# Patient Record
Sex: FEMALE | Race: WHITE | NOT HISPANIC OR LATINO | Employment: FULL TIME | URBAN - METROPOLITAN AREA
[De-identification: names, ages, dates, MRNs, and addresses within clinical notes are randomized per-mention and may not be internally consistent; named-entity substitution may affect disease eponyms.]

---

## 2017-02-22 ENCOUNTER — HISTORICAL (OUTPATIENT)
Dept: RADIOLOGY | Facility: HOSPITAL | Age: 22
End: 2017-02-22

## 2017-05-24 ENCOUNTER — HISTORICAL (OUTPATIENT)
Dept: SURGERY | Facility: HOSPITAL | Age: 22
End: 2017-05-24

## 2017-05-24 LAB
ABS NEUT (OLG): 4.8 X10(3)/MCL (ref 1.5–6.9)
B-HCG SERPL QL: NEGATIVE
BUN SERPL-MCNC: 6 MG/DL (ref 10–20)
CALCIUM SERPL-MCNC: 8.7 MG/DL (ref 8–10.5)
CHLORIDE SERPL-SCNC: 106 MMOL/L (ref 100–108)
CO2 SERPL-SCNC: 29 MMOL/L (ref 21–35)
CREAT SERPL-MCNC: 0.64 MG/DL (ref 0.7–1.3)
ERYTHROCYTE [DISTWIDTH] IN BLOOD BY AUTOMATED COUNT: 12.3 % (ref 11.5–17)
GLUCOSE SERPL-MCNC: 81 MG/DL (ref 75–116)
GROUP & RH: NORMAL
HCT VFR BLD AUTO: 36.3 % (ref 36–48)
HGB BLD-MCNC: 13.1 GM/DL (ref 12–16)
MCH RBC QN AUTO: 31 PG (ref 27–34)
MCHC RBC AUTO-ENTMCNC: 36 GM/DL (ref 31–36)
MCV RBC AUTO: 86 FL (ref 80–99)
PLATELET # BLD AUTO: 309 X10(3)/MCL (ref 140–400)
PMV BLD AUTO: 9.3 FL (ref 6.8–10)
POTASSIUM SERPL-SCNC: 3.8 MMOL/L (ref 3.6–5.2)
RBC # BLD AUTO: 4.23 X10(6)/MCL (ref 4.2–5.4)
SODIUM SERPL-SCNC: 141 MMOL/L (ref 135–145)
WBC # SPEC AUTO: 7.3 X10(3)/MCL (ref 4.5–11.5)

## 2017-05-26 ENCOUNTER — HISTORICAL (OUTPATIENT)
Dept: ANESTHESIOLOGY | Facility: HOSPITAL | Age: 22
End: 2017-05-26

## 2017-08-03 ENCOUNTER — HISTORICAL (OUTPATIENT)
Dept: RADIOLOGY | Facility: HOSPITAL | Age: 22
End: 2017-08-03

## 2017-08-03 LAB
IRON SERPL-MCNC: 126 MCG/DL (ref 50–170)
T3FREE SERPL-MCNC: 3.11 PG/ML (ref 2.18–3.98)
T4 FREE SERPL-MCNC: 0.95 NG/DL (ref 0.76–1.46)
TESTOST SERPL-MCNC: 41.6 NG/DL (ref 14–76)
TSH SERPL-ACNC: 1.04 MIU/ML (ref 0.36–3.74)
VIT B12 SERPL-MCNC: 616 PG/ML (ref 193–986)

## 2018-02-21 ENCOUNTER — HISTORICAL (OUTPATIENT)
Dept: RADIOLOGY | Facility: HOSPITAL | Age: 23
End: 2018-02-21

## 2018-02-21 LAB
ABS NEUT (OLG): 5.3 X10(3)/MCL (ref 1.5–6.9)
ALBUMIN SERPL-MCNC: 3.8 GM/DL (ref 3.4–5)
ALBUMIN/GLOB SERPL: 1 RATIO
ALP SERPL-CCNC: 61 UNIT/L (ref 30–113)
ALT SERPL-CCNC: 25 UNIT/L (ref 10–45)
AST SERPL-CCNC: 14 UNIT/L (ref 15–37)
BILIRUB SERPL-MCNC: 0.7 MG/DL (ref 0.1–0.9)
BILIRUBIN DIRECT+TOT PNL SERPL-MCNC: 0.2 MG/DL (ref 0–0.3)
BILIRUBIN DIRECT+TOT PNL SERPL-MCNC: 0.5 MG/DL
BUN SERPL-MCNC: 11 MG/DL (ref 10–20)
CALCIUM SERPL-MCNC: 9.1 MG/DL (ref 8–10.5)
CHLORIDE SERPL-SCNC: 102 MMOL/L (ref 100–108)
CO2 SERPL-SCNC: 26 MMOL/L (ref 21–35)
CREAT SERPL-MCNC: 0.69 MG/DL (ref 0.7–1.3)
DEPRECATED CALCIDIOL+CALCIFEROL SERPL-MC: 35.73 NG/ML (ref 30–80)
ERYTHROCYTE [DISTWIDTH] IN BLOOD BY AUTOMATED COUNT: 12.5 % (ref 11.5–17)
GLOBULIN SER-MCNC: 3.9 GM/DL
GLUCOSE SERPL-MCNC: 84 MG/DL (ref 75–116)
HCT VFR BLD AUTO: 37.5 % (ref 36–48)
HGB BLD-MCNC: 13.4 GM/DL (ref 12–16)
MAGNESIUM SERPL-MCNC: 1.9 MG/DL (ref 1.8–2.4)
MCH RBC QN AUTO: 30 PG (ref 27–34)
MCHC RBC AUTO-ENTMCNC: 36 GM/DL (ref 31–36)
MCV RBC AUTO: 85 FL (ref 80–99)
PHOSPHATE SERPL-MCNC: 3.2 MG/DL (ref 2.6–4.7)
PLATELET # BLD AUTO: 292 X10(3)/MCL (ref 140–400)
PMV BLD AUTO: 10.1 FL (ref 6.8–10)
POTASSIUM SERPL-SCNC: 3.5 MMOL/L (ref 3.6–5.2)
PROT SERPL-MCNC: 7.7 GM/DL (ref 6.4–8.2)
PTH-INTACT SERPL-MCNC: 39.7 PG/ML (ref 18.4–80.1)
RBC # BLD AUTO: 4.43 X10(6)/MCL (ref 4.2–5.4)
SODIUM SERPL-SCNC: 137 MMOL/L (ref 135–145)
TSH SERPL-ACNC: 0.95 MIU/ML (ref 0.36–3.74)
WBC # SPEC AUTO: 8.1 X10(3)/MCL (ref 4.5–11.5)

## 2018-03-05 ENCOUNTER — HISTORICAL (OUTPATIENT)
Dept: RADIOLOGY | Facility: HOSPITAL | Age: 23
End: 2018-03-05

## 2018-04-23 ENCOUNTER — HISTORICAL (OUTPATIENT)
Dept: SURGERY | Facility: HOSPITAL | Age: 23
End: 2018-04-23

## 2018-04-23 LAB
ABS NEUT (OLG): 3.4 X10(3)/MCL (ref 1.5–6.9)
B-HCG SERPL QL: NEGATIVE
BUN SERPL-MCNC: 8 MG/DL (ref 10–20)
CALCIUM SERPL-MCNC: 9.2 MG/DL (ref 8–10.5)
CHLORIDE SERPL-SCNC: 103 MMOL/L (ref 100–108)
CO2 SERPL-SCNC: 29 MMOL/L (ref 21–35)
CREAT SERPL-MCNC: 0.58 MG/DL (ref 0.7–1.3)
CREAT/UREA NIT SERPL: 14
ERYTHROCYTE [DISTWIDTH] IN BLOOD BY AUTOMATED COUNT: 12.3 % (ref 11.5–17)
GLUCOSE SERPL-MCNC: 83 MG/DL (ref 75–116)
GROUP & RH: NORMAL
HCT VFR BLD AUTO: 37.9 % (ref 36–48)
HGB BLD-MCNC: 13.4 GM/DL (ref 12–16)
MCH RBC QN AUTO: 31 PG (ref 27–34)
MCHC RBC AUTO-ENTMCNC: 35 GM/DL (ref 31–36)
MCV RBC AUTO: 87 FL (ref 80–99)
PLATELET # BLD AUTO: 308 X10(3)/MCL (ref 140–400)
PMV BLD AUTO: 9.4 FL (ref 6.8–10)
POTASSIUM SERPL-SCNC: 3.8 MMOL/L (ref 3.6–5.2)
RBC # BLD AUTO: 4.37 X10(6)/MCL (ref 4.2–5.4)
SODIUM SERPL-SCNC: 140 MMOL/L (ref 135–145)
WBC # SPEC AUTO: 6.6 X10(3)/MCL (ref 4.5–11.5)

## 2018-04-26 ENCOUNTER — HISTORICAL (OUTPATIENT)
Dept: ANESTHESIOLOGY | Facility: HOSPITAL | Age: 23
End: 2018-04-26

## 2018-09-26 ENCOUNTER — HISTORICAL (OUTPATIENT)
Dept: RADIOLOGY | Facility: HOSPITAL | Age: 23
End: 2018-09-26

## 2019-05-31 ENCOUNTER — HISTORICAL (OUTPATIENT)
Dept: LAB | Facility: HOSPITAL | Age: 24
End: 2019-05-31

## 2019-05-31 LAB
ABS NEUT (OLG): 5.2 X10(3)/MCL (ref 1.5–6.9)
ALBUMIN SERPL-MCNC: 3.6 GM/DL (ref 3.4–5)
ALBUMIN/GLOB SERPL: 1.1 RATIO
ALP SERPL-CCNC: 49 UNIT/L (ref 30–113)
ALT SERPL-CCNC: 15 UNIT/L (ref 10–45)
AST SERPL-CCNC: 8 UNIT/L (ref 15–37)
BASOPHILS # BLD AUTO: 0 X10(3)/MCL (ref 0–0.1)
BASOPHILS NFR BLD AUTO: 0 % (ref 0–1)
BILIRUB SERPL-MCNC: 0.4 MG/DL (ref 0.1–0.9)
BILIRUBIN DIRECT+TOT PNL SERPL-MCNC: 0.1 MG/DL (ref 0–0.3)
BILIRUBIN DIRECT+TOT PNL SERPL-MCNC: 0.3 MG/DL
BUN SERPL-MCNC: 9 MG/DL (ref 10–20)
CALCIUM SERPL-MCNC: 9 MG/DL (ref 8–10.5)
CHLORIDE SERPL-SCNC: 105 MMOL/L (ref 100–108)
CO2 SERPL-SCNC: 28 MMOL/L (ref 21–35)
CREAT SERPL-MCNC: 0.79 MG/DL (ref 0.7–1.3)
DEPRECATED CALCIDIOL+CALCIFEROL SERPL-MC: 39.59 NG/ML (ref 30–80)
EOSINOPHIL # BLD AUTO: 0.1 X10(3)/MCL (ref 0–0.6)
EOSINOPHIL NFR BLD AUTO: 1 % (ref 0–5)
ERYTHROCYTE [DISTWIDTH] IN BLOOD BY AUTOMATED COUNT: 12.1 % (ref 11.5–17)
GLOBULIN SER-MCNC: 3.4 GM/DL
GLUCOSE SERPL-MCNC: 76 MG/DL (ref 75–116)
HCT VFR BLD AUTO: 36.6 % (ref 36–48)
HGB BLD-MCNC: 12.5 GM/DL (ref 12–16)
IMM GRANULOCYTES # BLD AUTO: 0.03 10*3/UL (ref 0–0.02)
IMM GRANULOCYTES NFR BLD AUTO: 0.4 % (ref 0–0.43)
LYMPHOCYTES # BLD AUTO: 1.9 X10(3)/MCL (ref 0.5–4.1)
LYMPHOCYTES NFR BLD AUTO: 25 % (ref 15–40)
MCH RBC QN AUTO: 31 PG (ref 27–34)
MCHC RBC AUTO-ENTMCNC: 34 GM/DL (ref 31–36)
MCV RBC AUTO: 92 FL (ref 80–99)
MONOCYTES # BLD AUTO: 0.3 X10(3)/MCL (ref 0–1.1)
MONOCYTES NFR BLD AUTO: 4 % (ref 4–12)
NEUTROPHILS # BLD AUTO: 5.2 X10(3)/MCL (ref 1.5–6.9)
NEUTROPHILS NFR BLD AUTO: 68 % (ref 43–75)
PLATELET # BLD AUTO: 235 X10(3)/MCL (ref 140–400)
PMV BLD AUTO: 9.7 FL (ref 6.8–10)
POTASSIUM SERPL-SCNC: 3.4 MMOL/L (ref 3.6–5.2)
PROT SERPL-MCNC: 7 GM/DL (ref 6.4–8.2)
RBC # BLD AUTO: 3.99 X10(6)/MCL (ref 4.2–5.4)
SODIUM SERPL-SCNC: 141 MMOL/L (ref 135–145)
T3FREE SERPL-MCNC: 2.53 PG/ML (ref 2.18–3.98)
T4 SERPL-MCNC: 10.5 MCG/DL (ref 5.3–11.5)
TESTOST SERPL-MCNC: 15 NG/DL (ref 14–76)
TSH SERPL-ACNC: 0.64 MIU/ML (ref 0.36–3.74)
VIT B12 SERPL-MCNC: 446 PG/ML (ref 193–986)
WBC # SPEC AUTO: 7.6 X10(3)/MCL (ref 4.5–11.5)

## 2019-09-17 ENCOUNTER — HISTORICAL (OUTPATIENT)
Dept: LAB | Facility: HOSPITAL | Age: 24
End: 2019-09-17

## 2019-09-17 LAB
ABS NEUT (OLG): 3.8 X10(3)/MCL (ref 1.5–6.9)
ALBUMIN SERPL-MCNC: 3.8 GM/DL (ref 3.4–5)
ALBUMIN/GLOB SERPL: 1 RATIO
ALP SERPL-CCNC: 44 UNIT/L (ref 30–113)
ALT SERPL-CCNC: 16 UNIT/L (ref 10–45)
APPEARANCE, UA: CLEAR
AST SERPL-CCNC: 9 UNIT/L (ref 15–37)
BACTERIA SPEC CULT: NORMAL /HPF
BASOPHILS # BLD AUTO: 0 X10(3)/MCL (ref 0–0.1)
BASOPHILS NFR BLD AUTO: 0 % (ref 0–1)
BILIRUB SERPL-MCNC: 0.3 MG/DL (ref 0.1–0.9)
BILIRUB UR QL STRIP: NEGATIVE
BILIRUBIN DIRECT+TOT PNL SERPL-MCNC: 0.1 MG/DL (ref 0–0.3)
BILIRUBIN DIRECT+TOT PNL SERPL-MCNC: 0.2 MG/DL
BUN SERPL-MCNC: 8 MG/DL (ref 10–20)
CALCIUM SERPL-MCNC: 8.7 MG/DL (ref 8–10.5)
CHLORIDE SERPL-SCNC: 104 MMOL/L (ref 100–108)
CK MB SERPL-MCNC: 0.5 NG/ML (ref 0–3.6)
CK SERPL-CCNC: 31 UNIT/L (ref 39–225)
CO2 SERPL-SCNC: 28 MMOL/L (ref 21–35)
COLOR UR: YELLOW
CREAT SERPL-MCNC: 0.71 MG/DL (ref 0.7–1.3)
CRP SERPL-MCNC: <0.2 MG/DL (ref 0–0.9)
EOSINOPHIL # BLD AUTO: 0.2 X10(3)/MCL (ref 0–0.6)
EOSINOPHIL NFR BLD AUTO: 2 % (ref 0–5)
ERYTHROCYTE [DISTWIDTH] IN BLOOD BY AUTOMATED COUNT: 11.9 % (ref 11.5–17)
ERYTHROCYTE [SEDIMENTATION RATE] IN BLOOD: 11 MM/HR (ref 0–20)
GLOBULIN SER-MCNC: 3.9 GM/DL
GLUCOSE (UA): NEGATIVE
GLUCOSE SERPL-MCNC: 80 MG/DL (ref 75–116)
HCT VFR BLD AUTO: 37.4 % (ref 36–48)
HGB BLD-MCNC: 13 GM/DL (ref 12–16)
HGB UR QL STRIP: ABNORMAL
IMM GRANULOCYTES # BLD AUTO: 0.02 10*3/UL (ref 0–0.02)
IMM GRANULOCYTES NFR BLD AUTO: 0.3 % (ref 0–0.43)
KETONES UR QL STRIP: NEGATIVE
LEUKOCYTE ESTERASE UR QL STRIP: NEGATIVE
LYMPHOCYTES # BLD AUTO: 2.4 X10(3)/MCL (ref 0.5–4.1)
LYMPHOCYTES NFR BLD AUTO: 35 % (ref 15–40)
MCH RBC QN AUTO: 32 PG (ref 27–34)
MCHC RBC AUTO-ENTMCNC: 35 GM/DL (ref 31–36)
MCV RBC AUTO: 91 FL (ref 80–99)
MONOCYTES # BLD AUTO: 0.6 X10(3)/MCL (ref 0–1.1)
MONOCYTES NFR BLD AUTO: 8 % (ref 4–12)
NEUTROPHILS # BLD AUTO: 3.8 X10(3)/MCL (ref 1.5–6.9)
NEUTROPHILS NFR BLD AUTO: 54 % (ref 43–75)
NITRITE UR QL STRIP: NEGATIVE
PH UR STRIP: 6 [PH]
PLATELET # BLD AUTO: 301 X10(3)/MCL (ref 140–400)
PMV BLD AUTO: 9.6 FL (ref 6.8–10)
POTASSIUM SERPL-SCNC: 3.5 MMOL/L (ref 3.6–5.2)
PROT SERPL-MCNC: 7.7 GM/DL (ref 6.4–8.2)
PROT UR QL STRIP: NEGATIVE
RBC # BLD AUTO: 4.11 X10(6)/MCL (ref 4.2–5.4)
RBC #/AREA URNS HPF: NORMAL /HPF
SODIUM SERPL-SCNC: 141 MMOL/L (ref 135–145)
SP GR UR STRIP: 1.02
SQUAMOUS EPITHELIAL, UA: NORMAL
T3FREE SERPL-MCNC: 2.83 PG/ML (ref 2.18–3.98)
T4 SERPL-MCNC: 13 MCG/DL (ref 5.3–11.5)
TROPONIN I SERPL-MCNC: <0.017 NG/ML (ref 0–0.06)
TSH SERPL-ACNC: 1.2 MIU/ML (ref 0.36–3.74)
URATE SERPL-MCNC: 3.9 MG/DL (ref 2.6–7.2)
UROBILINOGEN UR STRIP-ACNC: 1 EU/DL
WBC # SPEC AUTO: 6.9 X10(3)/MCL (ref 4.5–11.5)
WBC #/AREA URNS HPF: NORMAL /[HPF]

## 2020-02-26 LAB
ABS NEUT (OLG): 4.6 X10(3)/MCL (ref 1.5–6.9)
B-HCG SERPL QL: NEGATIVE
BUN SERPL-MCNC: 13 MG/DL (ref 10–20)
CALCIUM SERPL-MCNC: 9 MG/DL (ref 8–10.5)
CHLORIDE SERPL-SCNC: 106 MMOL/L (ref 100–108)
CO2 SERPL-SCNC: 32 MMOL/L (ref 21–35)
CREAT SERPL-MCNC: 0.81 MG/DL (ref 0.7–1.3)
CREAT/UREA NIT SERPL: 16
ERYTHROCYTE [DISTWIDTH] IN BLOOD BY AUTOMATED COUNT: 12.1 % (ref 11.5–17)
GLUCOSE SERPL-MCNC: 87 MG/DL (ref 75–116)
GROUP & RH: NORMAL
HCT VFR BLD AUTO: 37.7 % (ref 36–48)
HGB BLD-MCNC: 13.2 GM/DL (ref 12–16)
MCH RBC QN AUTO: 31 PG (ref 27–34)
MCHC RBC AUTO-ENTMCNC: 35 GM/DL (ref 31–36)
MCV RBC AUTO: 88 FL (ref 80–99)
PLATELET # BLD AUTO: 246 X10(3)/MCL (ref 140–400)
PMV BLD AUTO: 8.9 FL (ref 6.8–10)
POTASSIUM SERPL-SCNC: 3.7 MMOL/L (ref 3.6–5.2)
RBC # BLD AUTO: 4.28 X10(6)/MCL (ref 4.2–5.4)
SODIUM SERPL-SCNC: 144 MMOL/L (ref 135–145)
WBC # SPEC AUTO: 7.5 X10(3)/MCL (ref 4.5–11.5)

## 2020-02-28 ENCOUNTER — HISTORICAL (OUTPATIENT)
Dept: ANESTHESIOLOGY | Facility: HOSPITAL | Age: 25
End: 2020-02-28

## 2020-12-04 ENCOUNTER — HISTORICAL (OUTPATIENT)
Dept: LAB | Facility: HOSPITAL | Age: 25
End: 2020-12-04

## 2020-12-04 LAB
ABS NEUT (OLG): 2.9 X10(3)/MCL (ref 1.5–6.9)
ALBUMIN SERPL-MCNC: 4 GM/DL (ref 3.5–5)
ALBUMIN/GLOB SERPL: 1.3 RATIO (ref 1.1–2)
ALP SERPL-CCNC: 50 UNIT/L (ref 40–150)
ALT SERPL-CCNC: 12 UNIT/L (ref 0–55)
AST SERPL-CCNC: 13 UNIT/L (ref 5–34)
BILIRUB SERPL-MCNC: 0.7 MG/DL
BILIRUBIN DIRECT+TOT PNL SERPL-MCNC: 0.3 MG/DL (ref 0–0.5)
BILIRUBIN DIRECT+TOT PNL SERPL-MCNC: 0.4 MG/DL (ref 0–0.8)
BUN SERPL-MCNC: 8 MG/DL (ref 7–18.7)
CALCIUM SERPL-MCNC: 9.2 MG/DL (ref 8.4–10.2)
CHLORIDE SERPL-SCNC: 104 MMOL/L (ref 98–107)
CO2 SERPL-SCNC: 29 MMOL/L (ref 22–29)
CREAT SERPL-MCNC: 0.73 MG/DL (ref 0.55–1.02)
ERYTHROCYTE [DISTWIDTH] IN BLOOD BY AUTOMATED COUNT: 11.9 % (ref 11.5–17)
GLOBULIN SER-MCNC: 3 GM/DL (ref 2.4–3.5)
GLUCOSE SERPL-MCNC: 81 MG/DL (ref 74–100)
HCT VFR BLD AUTO: 37.3 % (ref 36–48)
HGB BLD-MCNC: 12.9 GM/DL (ref 12–16)
HIV 1+2 AB+HIV1 P24 AG SERPL QL IA: NONREACTIVE
MCH RBC QN AUTO: 31 PG (ref 27–34)
MCHC RBC AUTO-ENTMCNC: 35 GM/DL (ref 31–36)
MCV RBC AUTO: 89 FL (ref 80–99)
PLATELET # BLD AUTO: 253 X10(3)/MCL (ref 140–400)
PMV BLD AUTO: 9.2 FL (ref 6.8–10)
POTASSIUM SERPL-SCNC: 3.5 MMOL/L (ref 3.5–5.1)
PROT SERPL-MCNC: 7 GM/DL (ref 6.4–8.3)
RBC # BLD AUTO: 4.2 X10(6)/MCL (ref 4.2–5.4)
SODIUM SERPL-SCNC: 141 MMOL/L (ref 136–145)
TSH SERPL-ACNC: 1.43 UIU/ML (ref 0.35–4.94)
WBC # SPEC AUTO: 5.9 X10(3)/MCL (ref 4.5–11.5)

## 2020-12-13 ENCOUNTER — HISTORICAL (OUTPATIENT)
Dept: ADMINISTRATIVE | Facility: HOSPITAL | Age: 25
End: 2020-12-13

## 2020-12-16 ENCOUNTER — HISTORICAL (OUTPATIENT)
Dept: LAB | Facility: HOSPITAL | Age: 25
End: 2020-12-16

## 2020-12-16 LAB — B-HCG FREE SERPL-ACNC: 177.74 MIU/ML

## 2020-12-28 LAB
BILIRUB SERPL-MCNC: NEGATIVE MG/DL
BLOOD URINE, POC: NEGATIVE
CLARITY, POC UA: NORMAL
COLOR, POC UA: YELLOW
GLUCOSE UR QL STRIP: NEGATIVE
KETONES UR QL STRIP: NEGATIVE
LEUKOCYTE EST, POC UA: NEGATIVE
NITRITE, POC UA: NEGATIVE
PH, POC UA: 7
PROTEIN, POC: NEGATIVE
SPECIFIC GRAVITY, POC UA: 1.02
UROBILINOGEN, POC UA: NORMAL

## 2021-01-13 LAB
CLARITY, POC UA: CLEAR
COLOR, POC UA: YELLOW
GLUCOSE UR QL STRIP: NEGATIVE
LEUKOCYTE EST, POC UA: NEGATIVE
NITRITE, POC UA: NEGATIVE
PROTEIN, POC: NEGATIVE

## 2021-03-31 ENCOUNTER — HISTORICAL (OUTPATIENT)
Dept: ADMINISTRATIVE | Facility: HOSPITAL | Age: 26
End: 2021-03-31

## 2021-03-31 LAB
ABS NEUT (OLG): 6.67 X10(3)/MCL (ref 2.1–9.2)
BASOPHILS # BLD AUTO: 0 X10(3)/MCL (ref 0–0.2)
BASOPHILS NFR BLD AUTO: 0 %
EOSINOPHIL # BLD AUTO: 0.1 X10(3)/MCL (ref 0–0.9)
EOSINOPHIL NFR BLD AUTO: 1 %
ERYTHROCYTE [DISTWIDTH] IN BLOOD BY AUTOMATED COUNT: 13.1 % (ref 11.5–17)
GROUP & RH: NORMAL
HBV SURFACE AG SERPL QL IA: NONREACTIVE
HCT VFR BLD AUTO: 34.3 % (ref 37–47)
HCV AB SERPL QL IA: NONREACTIVE
HGB BLD-MCNC: 11.6 GM/DL (ref 12–16)
HIV 1+2 AB+HIV1 P24 AG SERPL QL IA: NONREACTIVE
LYMPHOCYTES # BLD AUTO: 1.7 X10(3)/MCL (ref 0.6–4.6)
LYMPHOCYTES NFR BLD AUTO: 19 %
MCH RBC QN AUTO: 30.8 PG (ref 27–31)
MCHC RBC AUTO-ENTMCNC: 33.8 GM/DL (ref 33–36)
MCV RBC AUTO: 91 FL (ref 80–94)
MONOCYTES # BLD AUTO: 0.5 X10(3)/MCL (ref 0.1–1.3)
MONOCYTES NFR BLD AUTO: 5 %
NEUTROPHILS # BLD AUTO: 6.67 X10(3)/MCL (ref 2.1–9.2)
NEUTROPHILS NFR BLD AUTO: 73 %
PLATELET # BLD AUTO: 270 X10(3)/MCL (ref 130–400)
PMV BLD AUTO: 9.6 FL (ref 9.4–12.4)
RBC # BLD AUTO: 3.77 X10(6)/MCL (ref 4.2–5.4)
T PALLIDUM AB SER QL: NONREACTIVE
TSH SERPL-ACNC: 1.16 UIU/ML (ref 0.35–4.94)
WBC # SPEC AUTO: 9.1 X10(3)/MCL (ref 4.5–11.5)

## 2021-04-02 LAB — FINAL CULTURE: NO GROWTH

## 2021-04-28 ENCOUNTER — HISTORICAL (OUTPATIENT)
Dept: ADMINISTRATIVE | Facility: HOSPITAL | Age: 26
End: 2021-04-28

## 2022-04-07 ENCOUNTER — HISTORICAL (OUTPATIENT)
Dept: ADMINISTRATIVE | Facility: HOSPITAL | Age: 27
End: 2022-04-07

## 2022-04-24 VITALS
DIASTOLIC BLOOD PRESSURE: 72 MMHG | WEIGHT: 140.88 LBS | BODY MASS INDEX: 25.92 KG/M2 | SYSTOLIC BLOOD PRESSURE: 122 MMHG | HEIGHT: 62 IN

## 2022-04-30 NOTE — OP NOTE
Patient:   Steph Alejandra             MRN: [id number]            FIN: [id number]               Age:   21 years     Sex:  Female     :  1995   Associated Diagnoses:   None   Author:   Qasim Malone MD      Operative Note   Operative Information   Procedures Performed: Laparoscopic ovarian cystectomy and chromopertubation.     Preoperative Diagnosis: Pelvic pain.     Postoperative Diagnosis: Same plus left-sided ovarian cyst.     Surgeon: Qasim Malone MD.     Anesthesia: Gen. endotracheal.     Speciman Removed: Left-sided ovarian cyst wall.     Description of Procedure/Findings/    Complications: Patient was brought to the operating room which was placed under general endotracheal anesthesia.  She was laced in the dorsal lithotomy position and prepped and draped in usual fashion.  Sterile speculum was then placed inside the patient's vagina and a single-tooth tenaculum was used to grasp the anterior lip of the cervix.  [place] uterine manipulator was then placed into the cervical os.  And connected to dilutemethylene blue.  There attached to the patient's abdomen where a 5 mm infraumbilical incision was made with a scalpel.  The Veress needle was introduced into the peritoneal cavity verified to be intraperitoneal via the drop technique.  The abdomen was insufflated with 3 L of CO2.  Veress needle was then removed and a 5 mm trocar was advanced without difficulties and the 5 mm scope was placed to visualize the pelvis.  Another port 5 mm in dimension was placed in the midline just above the pubic symphysis without difficulties another port was placed on the patient's left side just lateral to the epigastric vessels in the midclavicular line.  Patient was placed in steep Trendelenburg and the chromopertubation was performed showing patency of both tubes, scalpel was then used to drain the left side ovarian cyst and straw-colored fluid and some cyst wall was then removed and sent to pathology good  hemostasis was visualized.  At that time the decision was made to terminate the procedure.  All instruments were removed under direct visualization with good hemostasis noted.  All skin incisions were closed with 3-0 Vicryl in a subcuticular fashion and all insurance removed from the vagina without difficulties.  Patient tolerated the procedure well and was transferred to recovery in stable condition thank you.     Esimated blood loss: No blood loss.     Findings: Normal appearing uterus and tubes left sided simple ovarian cyst.  I'd sided prominent ovary but no obvious cyst formations.  No evidence of endometriosis.     Complications: None.

## 2022-04-30 NOTE — OP NOTE
Patient:   Steph Alejandra             MRN: [id number]            FIN: [id number]               Age:   24 years     Sex:  Female     :  1995   Associated Diagnoses:   None   Author:   Evelyn Cloud MD      Operative Note   Operative Information   Date/ Time:  2020 07:54:00.     Procedures Performed: LEEP  ECC post LEEP.     Preoperative Diagnosis: Cervical Ectropion causing post coital Bleeding  ASCUS w/ HRHPV.     Postoperative Diagnosis: same.     Surgeon: Evelyn Cloud MD.     Anesthesia: IV sedation.     Speciman Removed: Cervical specimen  ECC.     Description of Procedure/Findings/    Complications: Pt was taken to the operating room where MAC anesthesia was found to be adequate. She was then draped in the usual fashion in the dorsal lithotomy position. At this time the the bivalve speculum was placed and the cervix was identified and swabbed with Lugols solution. The Negative area was identified and a large LEEP loop was used to excise the abnormal tissue noted. At this time an ECC was then performed. The remainder of the cervical bed was then cauterized with the bovie to obtain hemostasis.  The pt tolerated the procedure well and was taken to the recovery room..     Esimated blood loss: loss less than  25  cc.     Findings: Lugols negative area 3 to 9 oclock.     Complications: None.

## 2022-04-30 NOTE — OP NOTE
PREOPERATIVE DIAGNOSIS:  Pelvic pain.    POSTOPERATIVE DIAGNOSIS:  Pelvic pain along with a right-sided multicystic ovary and endometriosis.    OPERATION PERFORMED:  Laparoscopy with fulguration of endometriosis implants, and right-sided salpingo-oophorectomy.    ANESTHESIA:  General endotracheal.    ESTIMATED BLOOD LOSS:  Per anesthesia record.    URINE OUTPUT:  Per anesthesia record.    IV FLUIDS:  Per anesthesia record.    FINDINGS:  Multicystic right ovary, along with small endometriosis implants on both uterosacral ligaments, left side worse than on the right side.  Otherwise, normal-appearing uterus, left ovary and bilateral tubes.    DESCRIPTION OF PROCEDURE:  The patient was brought to the operating room, where she was placed under general endotracheal anesthesia.  She was placed in the dorsal lithotomy position.  She was prepped and draped in usual fashion.  Her bladder was drained prior to her prep and draped.  A sterile speculum was then placed inside the patient's vagina and a single-tooth tenaculum was used to grasp the anterior lip of the cervix.  Hulka tenaculum was then advanced and the single-tooth tenaculum and speculum were then removed.  We then directed our attention to the patient's abdomen, where a 5 mm infraumbilical incision was made with a scalpel and the Veress needle was introduced into the peritoneal cavity.  The abdomen was then insufflated with 3 L of CO2 and the Veress needle was removed, and a 5 mm trocar was advanced.  Another 5 mm port was placed 2 cm above the pubic symphysis in the midline.  The patient was placed in steep Trendelenburg.  Upon visualization of the endometriosis and right ovarian cyst, the decision was made, in light of her previous history of an ovarian cystectomy and her persistent pelvic pain, to just go ahead and remove her right tube and ovary, as well as to fulgurate the endometriosis implants.  So, a 5 mm port was placed just lateral to the  epigastric vessels on the right side, and the 5 mm port that had been placed in the suprapubic region, was switched to a 10 mm port.  The Voyant was then used to transect across the infundibulopelvic vessels on the right side and all the way across the uterine ovarian pedicle, and the specimen was removed through an Endobag without difficulties.  Good hemostasis was noted.  Bipolar cautery was then used to fulgurate the endometriosis implants, along the uterosacral ligaments, without difficulty.  At that time, the procedure was terminated.  All instruments were removed under direct visualization with good hemostasis.  The 10 mm fascial incision site was closed with 0 Vicryl and the skin incisions were closed with 4-0 Vicryl.  The patient tolerated procedure well and was transferred to recovery in stable condition.        DUNCAN   DD: 04/27/2018 0614   DT: 04/27/2018 0630  Job # [id number]

## 2022-09-21 ENCOUNTER — HISTORICAL (OUTPATIENT)
Dept: ADMINISTRATIVE | Facility: HOSPITAL | Age: 27
End: 2022-09-21

## 2023-03-20 ENCOUNTER — LAB VISIT (OUTPATIENT)
Dept: LAB | Facility: HOSPITAL | Age: 28
End: 2023-03-20
Attending: NURSE PRACTITIONER
Payer: MEDICAID

## 2023-03-20 DIAGNOSIS — R53.81 DEBILITY: ICD-10-CM

## 2023-03-20 DIAGNOSIS — N91.2 ABSENCE OF MENSTRUATION: Primary | ICD-10-CM

## 2023-03-20 LAB
ALBUMIN SERPL-MCNC: 4 G/DL (ref 3.5–5)
ALBUMIN/GLOB SERPL: 1.4 RATIO (ref 1.1–2)
ALP SERPL-CCNC: 44 UNIT/L (ref 40–150)
ALT SERPL-CCNC: 25 UNIT/L (ref 0–55)
AST SERPL-CCNC: 27 UNIT/L (ref 5–34)
B-HCG FREE SERPL-ACNC: <2.42 MIU/ML
BILIRUBIN DIRECT+TOT PNL SERPL-MCNC: 0.5 MG/DL
BUN SERPL-MCNC: 8 MG/DL (ref 7–18.7)
CALCIUM SERPL-MCNC: 9.3 MG/DL (ref 8.4–10.2)
CHLORIDE SERPL-SCNC: 106 MMOL/L (ref 98–107)
CO2 SERPL-SCNC: 26 MMOL/L (ref 22–29)
CREAT SERPL-MCNC: 0.79 MG/DL (ref 0.55–1.02)
ERYTHROCYTE [DISTWIDTH] IN BLOOD BY AUTOMATED COUNT: 13.3 % (ref 11.5–17)
GFR SERPLBLD CREATININE-BSD FMLA CKD-EPI: >60 MLS/MIN/1.73/M2
GLOBULIN SER-MCNC: 2.9 GM/DL (ref 2.4–3.5)
GLUCOSE SERPL-MCNC: 84 MG/DL (ref 74–100)
HCT VFR BLD AUTO: 37.1 % (ref 37–47)
HGB BLD-MCNC: 12.4 G/DL (ref 12–16)
MCH RBC QN AUTO: 29.3 PG
MCHC RBC AUTO-ENTMCNC: 33.4 G/DL (ref 33–36)
MCV RBC AUTO: 87.7 FL (ref 80–94)
PLATELET # BLD AUTO: 279 X10(3)/MCL (ref 130–400)
PMV BLD AUTO: 10 FL (ref 7.4–10.4)
POTASSIUM SERPL-SCNC: 3.6 MMOL/L (ref 3.5–5.1)
PROT SERPL-MCNC: 6.9 GM/DL (ref 6.4–8.3)
RBC # BLD AUTO: 4.23 X10(6)/MCL (ref 4.2–5.4)
SODIUM SERPL-SCNC: 138 MMOL/L (ref 136–145)
TSH SERPL-ACNC: 0.51 UIU/ML (ref 0.35–4.94)
WBC # SPEC AUTO: 7.7 X10(3)/MCL (ref 4.5–11.5)

## 2023-03-20 PROCEDURE — 84443 ASSAY THYROID STIM HORMONE: CPT

## 2023-03-20 PROCEDURE — 80053 COMPREHEN METABOLIC PANEL: CPT

## 2023-03-20 PROCEDURE — 36415 COLL VENOUS BLD VENIPUNCTURE: CPT

## 2023-03-20 PROCEDURE — 85027 COMPLETE CBC AUTOMATED: CPT

## 2023-03-20 PROCEDURE — 84702 CHORIONIC GONADOTROPIN TEST: CPT

## 2025-06-20 ENCOUNTER — HOSPITAL ENCOUNTER (EMERGENCY)
Facility: HOSPITAL | Age: 30
Discharge: ANOTHER HOSPITAL | End: 2025-06-20
Attending: GENERAL PRACTICE
Payer: MEDICAID

## 2025-06-20 ENCOUNTER — HOSPITAL ENCOUNTER (INPATIENT)
Facility: HOSPITAL | Age: 30
LOS: 8 days | Discharge: HOME | DRG: 885 | End: 2025-06-28
Attending: PSYCHIATRY & NEUROLOGY | Admitting: PSYCHIATRY & NEUROLOGY
Payer: MEDICAID

## 2025-06-20 VITALS
DIASTOLIC BLOOD PRESSURE: 77 MMHG | TEMPERATURE: 99 F | WEIGHT: 143 LBS | HEIGHT: 61 IN | BODY MASS INDEX: 27 KG/M2 | HEART RATE: 91 BPM | RESPIRATION RATE: 18 BRPM | SYSTOLIC BLOOD PRESSURE: 119 MMHG | OXYGEN SATURATION: 100 %

## 2025-06-20 DIAGNOSIS — F29 PSYCHOSIS, UNSPECIFIED PSYCHOSIS TYPE: ICD-10-CM

## 2025-06-20 DIAGNOSIS — R45.851 SUICIDAL IDEATION: Primary | ICD-10-CM

## 2025-06-20 PROBLEM — F33.9 MAJOR DEPRESSION, RECURRENT: Status: ACTIVE | Noted: 2025-06-20

## 2025-06-20 PROBLEM — F10.20 ALCOHOL USE DISORDER, SEVERE, DEPENDENCE: Status: ACTIVE | Noted: 2025-06-20

## 2025-06-20 LAB
ACCEPTIBLE SP GR UR QL: <=1.005 (ref 1–1.03)
ALBUMIN SERPL-MCNC: 4.4 G/DL (ref 3.5–5)
ALBUMIN/GLOB SERPL: 1.3 RATIO (ref 1.1–2)
ALP SERPL-CCNC: 41 UNIT/L (ref 40–150)
ALT SERPL-CCNC: 17 UNIT/L (ref 0–55)
AMPHET UR QL SCN: POSITIVE
ANION GAP SERPL CALC-SCNC: 9 MEQ/L
APAP SERPL-MCNC: <3 UG/ML (ref 10–30)
AST SERPL-CCNC: 17 UNIT/L (ref 11–45)
B-HCG UR QL: NEGATIVE
BACTERIA #/AREA URNS AUTO: ABNORMAL /HPF
BARBITURATE SCN PRESENT UR: NEGATIVE
BASOPHILS # BLD AUTO: 0.04 X10(3)/MCL
BASOPHILS NFR BLD AUTO: 0.5 %
BENZODIAZ UR QL SCN: NEGATIVE
BILIRUB SERPL-MCNC: 0.5 MG/DL
BILIRUB UR QL STRIP.AUTO: NEGATIVE
BUN SERPL-MCNC: 7 MG/DL (ref 7–18.7)
CALCIUM SERPL-MCNC: 9.3 MG/DL (ref 8.4–10.2)
CANNABINOIDS UR QL SCN: NEGATIVE
CHLORIDE SERPL-SCNC: 111 MMOL/L (ref 98–107)
CLARITY UR: CLEAR
CO2 SERPL-SCNC: 24 MMOL/L (ref 22–29)
COCAINE UR QL SCN: NEGATIVE
COLOR UR AUTO: YELLOW
CREAT SERPL-MCNC: 0.73 MG/DL (ref 0.55–1.02)
CREAT/UREA NIT SERPL: 10
EOSINOPHIL # BLD AUTO: 0.05 X10(3)/MCL (ref 0–0.9)
EOSINOPHIL NFR BLD AUTO: 0.6 %
ERYTHROCYTE [DISTWIDTH] IN BLOOD BY AUTOMATED COUNT: 11.9 % (ref 11.5–17)
ETHANOL SERPL-MCNC: 121 MG/DL
FENTANYL UR QL SCN: NEGATIVE
GFR SERPLBLD CREATININE-BSD FMLA CKD-EPI: >60 ML/MIN/1.73/M2
GLOBULIN SER-MCNC: 3.5 GM/DL (ref 2.4–3.5)
GLUCOSE SERPL-MCNC: 98 MG/DL (ref 74–100)
GLUCOSE UR QL STRIP: NEGATIVE
HCT VFR BLD AUTO: 37.9 % (ref 37–47)
HGB BLD-MCNC: 13.7 G/DL (ref 12–16)
HGB UR QL STRIP: ABNORMAL
IMM GRANULOCYTES # BLD AUTO: 0.02 X10(3)/MCL (ref 0–0.04)
IMM GRANULOCYTES NFR BLD AUTO: 0.2 %
KETONES UR QL STRIP: NEGATIVE
LEUKOCYTE ESTERASE UR QL STRIP: ABNORMAL
LYMPHOCYTES # BLD AUTO: 2.61 X10(3)/MCL (ref 0.6–4.6)
LYMPHOCYTES NFR BLD AUTO: 30.8 %
MCH RBC QN AUTO: 31.4 PG (ref 27–31)
MCHC RBC AUTO-ENTMCNC: 36.1 G/DL (ref 33–36)
MCV RBC AUTO: 86.7 FL (ref 80–94)
MDMA UR QL SCN: NEGATIVE
MONOCYTES # BLD AUTO: 0.66 X10(3)/MCL (ref 0.1–1.3)
MONOCYTES NFR BLD AUTO: 7.8 %
NEUTROPHILS # BLD AUTO: 5.09 X10(3)/MCL (ref 2.1–9.2)
NEUTROPHILS NFR BLD AUTO: 60.1 %
NITRITE UR QL STRIP: NEGATIVE
NRBC BLD AUTO-RTO: 0 %
OPIATES UR QL SCN: NEGATIVE
PCP UR QL: NEGATIVE
PH UR STRIP: 6 [PH]
PH UR: 6 [PH] (ref 3–11)
PLATELET # BLD AUTO: 277 X10(3)/MCL (ref 130–400)
PMV BLD AUTO: 9.3 FL (ref 7.4–10.4)
POTASSIUM SERPL-SCNC: 4 MMOL/L (ref 3.5–5.1)
PROT SERPL-MCNC: 7.9 GM/DL (ref 6.4–8.3)
PROT UR QL STRIP: NEGATIVE
RBC # BLD AUTO: 4.37 X10(6)/MCL (ref 4.2–5.4)
RBC #/AREA URNS AUTO: ABNORMAL /HPF
SALICYLATES SERPL-MCNC: <5 MG/DL (ref 15–30)
SODIUM SERPL-SCNC: 144 MMOL/L (ref 136–145)
SP GR UR STRIP.AUTO: <=1.005 (ref 1–1.03)
SQUAMOUS #/AREA URNS AUTO: ABNORMAL /HPF
UROBILINOGEN UR STRIP-ACNC: 0.2
WBC # BLD AUTO: 8.47 X10(3)/MCL (ref 4.5–11.5)
WBC #/AREA URNS AUTO: ABNORMAL /HPF

## 2025-06-20 PROCEDURE — 80307 DRUG TEST PRSMV CHEM ANLYZR: CPT | Performed by: GENERAL PRACTICE

## 2025-06-20 PROCEDURE — 80143 DRUG ASSAY ACETAMINOPHEN: CPT | Performed by: GENERAL PRACTICE

## 2025-06-20 PROCEDURE — 99285 EMERGENCY DEPT VISIT HI MDM: CPT

## 2025-06-20 PROCEDURE — 82077 ASSAY SPEC XCP UR&BREATH IA: CPT | Performed by: GENERAL PRACTICE

## 2025-06-20 PROCEDURE — 80053 COMPREHEN METABOLIC PANEL: CPT | Performed by: GENERAL PRACTICE

## 2025-06-20 PROCEDURE — 25000003 PHARM REV CODE 250: Performed by: PSYCHIATRY & NEUROLOGY

## 2025-06-20 PROCEDURE — 11400000 HC PSYCH PRIVATE ROOM

## 2025-06-20 PROCEDURE — 25000003 PHARM REV CODE 250: Performed by: INTERNAL MEDICINE

## 2025-06-20 PROCEDURE — 81025 URINE PREGNANCY TEST: CPT | Performed by: GENERAL PRACTICE

## 2025-06-20 PROCEDURE — 85025 COMPLETE CBC W/AUTO DIFF WBC: CPT | Performed by: GENERAL PRACTICE

## 2025-06-20 PROCEDURE — 81001 URINALYSIS AUTO W/SCOPE: CPT | Performed by: GENERAL PRACTICE

## 2025-06-20 PROCEDURE — 80179 DRUG ASSAY SALICYLATE: CPT | Performed by: GENERAL PRACTICE

## 2025-06-20 RX ORDER — FUROSEMIDE 20 MG/1
20 TABLET ORAL
Status: ON HOLD | COMMUNITY
End: 2025-06-27 | Stop reason: HOSPADM

## 2025-06-20 RX ORDER — LORAZEPAM 1 MG/1
1 TABLET ORAL 4 TIMES DAILY
Status: DISCONTINUED | OUTPATIENT
Start: 2025-06-20 | End: 2025-06-23

## 2025-06-20 RX ORDER — ACETAMINOPHEN 325 MG/1
650 TABLET ORAL EVERY 6 HOURS PRN
Status: DISCONTINUED | OUTPATIENT
Start: 2025-06-20 | End: 2025-06-28 | Stop reason: HOSPADM

## 2025-06-20 RX ORDER — MINOXIDIL 2 %
SOLUTION, NON-ORAL TOPICAL
Status: ON HOLD | COMMUNITY
Start: 2025-06-19 | End: 2025-06-27 | Stop reason: HOSPADM

## 2025-06-20 RX ORDER — ZOLPIDEM TARTRATE 5 MG/1
5 TABLET ORAL NIGHTLY PRN
Status: DISCONTINUED | OUTPATIENT
Start: 2025-06-20 | End: 2025-06-20 | Stop reason: HOSPADM

## 2025-06-20 RX ORDER — BUPROPION HYDROCHLORIDE 100 MG/1
100 TABLET, EXTENDED RELEASE ORAL 2 TIMES DAILY
Status: DISCONTINUED | OUTPATIENT
Start: 2025-06-20 | End: 2025-06-23

## 2025-06-20 RX ORDER — OLANZAPINE 10 MG/1
10 TABLET, ORALLY DISINTEGRATING ORAL EVERY 8 HOURS PRN
Status: DISCONTINUED | OUTPATIENT
Start: 2025-06-20 | End: 2025-06-28 | Stop reason: HOSPADM

## 2025-06-20 RX ORDER — DEXTROAMPHETAMINE SACCHARATE, AMPHETAMINE ASPARTATE, DEXTROAMPHETAMINE SULFATE, AND AMPHETAMINE SULFATE 7.5; 7.5; 7.5; 7.5 MG/1; MG/1; MG/1; MG/1
TABLET ORAL
Status: ON HOLD | COMMUNITY
Start: 2025-06-19 | End: 2025-06-27 | Stop reason: HOSPADM

## 2025-06-20 RX ORDER — LUMATEPERONE 10.5 MG/1
CAPSULE ORAL
Status: ON HOLD | COMMUNITY
End: 2025-06-27 | Stop reason: HOSPADM

## 2025-06-20 RX ORDER — OLANZAPINE 10 MG/1
10 TABLET, ORALLY DISINTEGRATING ORAL NIGHTLY
Status: DISCONTINUED | OUTPATIENT
Start: 2025-06-20 | End: 2025-06-20

## 2025-06-20 RX ORDER — TRAZODONE HYDROCHLORIDE 100 MG/1
100 TABLET ORAL 2 TIMES DAILY
Status: ON HOLD | COMMUNITY
End: 2025-06-27 | Stop reason: HOSPADM

## 2025-06-20 RX ORDER — IBUPROFEN 400 MG/1
400 TABLET, FILM COATED ORAL EVERY 6 HOURS PRN
Status: DISCONTINUED | OUTPATIENT
Start: 2025-06-20 | End: 2025-06-28 | Stop reason: HOSPADM

## 2025-06-20 RX ORDER — METOPROLOL SUCCINATE 25 MG/1
25 TABLET, EXTENDED RELEASE ORAL DAILY
Status: DISCONTINUED | OUTPATIENT
Start: 2025-06-20 | End: 2025-06-26

## 2025-06-20 RX ORDER — MIRTAZAPINE 15 MG/1
15 TABLET, FILM COATED ORAL NIGHTLY
Status: DISCONTINUED | OUTPATIENT
Start: 2025-06-20 | End: 2025-06-23

## 2025-06-20 RX ORDER — METOPROLOL SUCCINATE 25 MG/1
TABLET, EXTENDED RELEASE ORAL
COMMUNITY
Start: 2025-06-19

## 2025-06-20 RX ORDER — MEDROXYPROGESTERONE ACETATE 5 MG/1
5 TABLET ORAL
Status: ON HOLD | COMMUNITY
End: 2025-06-27 | Stop reason: HOSPADM

## 2025-06-20 RX ORDER — ALUMINUM HYDROXIDE, MAGNESIUM HYDROXIDE, AND SIMETHICONE 1200; 120; 1200 MG/30ML; MG/30ML; MG/30ML
30 SUSPENSION ORAL EVERY 6 HOURS PRN
Status: DISCONTINUED | OUTPATIENT
Start: 2025-06-20 | End: 2025-06-28 | Stop reason: HOSPADM

## 2025-06-20 RX ORDER — DROSPIRENONE 4 MG/1
1 TABLET, FILM COATED ORAL
Status: ON HOLD | COMMUNITY
Start: 2025-06-07 | End: 2025-06-27 | Stop reason: HOSPADM

## 2025-06-20 RX ORDER — HYDROXYZINE PAMOATE 25 MG/1
50 CAPSULE ORAL EVERY 6 HOURS PRN
Status: DISCONTINUED | OUTPATIENT
Start: 2025-06-20 | End: 2025-06-28 | Stop reason: HOSPADM

## 2025-06-20 RX ORDER — LAMOTRIGINE 25 MG/1
TABLET ORAL
Status: ON HOLD | COMMUNITY
Start: 2025-06-19 | End: 2025-06-27 | Stop reason: HOSPADM

## 2025-06-20 RX ADMIN — BUPROPION HYDROCHLORIDE 100 MG: 100 TABLET, FILM COATED, EXTENDED RELEASE ORAL at 08:06

## 2025-06-20 RX ADMIN — LORAZEPAM 1 MG: 1 TABLET ORAL at 01:06

## 2025-06-20 RX ADMIN — METOPROLOL SUCCINATE 25 MG: 25 TABLET, EXTENDED RELEASE ORAL at 02:06

## 2025-06-20 RX ADMIN — LORAZEPAM 1 MG: 1 TABLET ORAL at 08:06

## 2025-06-20 RX ADMIN — MIRTAZAPINE 15 MG: 15 TABLET, FILM COATED ORAL at 08:06

## 2025-06-20 RX ADMIN — ACETAMINOPHEN 650 MG: 325 TABLET ORAL at 06:06

## 2025-06-20 RX ADMIN — LORAZEPAM 1 MG: 1 TABLET ORAL at 05:06

## 2025-06-20 NOTE — TREATMENT PLAN
Treatment Recommendation: To address problem(s) #    Pt reports to decrease in depressive symptoms (). Pt reports to decrease in anxiety symptoms (). Pt reports to an increase in sleep hours (). Pt reports to medication compliance (). Pt reports to less feelings of hopefulness (). Pt to establish 3 coping skills prior to DC (). Pt reports to decrease in paranoia (). Reports to decrease in SI, HI, VH and AH ().     1:1 Intervention (as needed)    Stress Management Skilled Activity  Coping Skilled Activity    Treatment Goal(s):  Long Term Goals Refer To Master Treatment Plan    Short Term Treatment Goal(s)  Patient Will:  Comply with Treatment  Exhibit Improvement in Mood  Demonstrate Improvement in Thought Processes / Awareness  Integrate with Therapeutic Milieu  Demonstrate Constructive Expression of Feelings and Behavior  Verbalize Improvement in Mood  Identify at Least 2 Coping Skills or Leisure Skills to Reduce Depression and Hopelessness Upon Request from Therapist    Discharge Recommendations:  Encourage Patient to Actively Utilize Available Community Resources to Increase Leisure Involvement to Decrease Signs and Symptoms of Illness  Encourage Patient to Utilize Coping Skills on a Regular Basis to Reduce the Risk of Decompensating and Re-Hospitalizations  Follow Up with After Care Appointments
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
## 2025-06-20 NOTE — ED NOTES
29 year old female ambulatory to ED for a psychiatric evaluation.  Patient states suicidal ideations that began today.  No plan stated.  States history of suicide attempt
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
## 2025-06-20 NOTE — ASSESSMENT & PLAN NOTE
Patient with a clear evidence of abuse of psychoactive substance particularly alcohol at this time need ongoing management.  We will need to look at the possibility of utilization of trials of agents for assistance in terms of preventing cravings for substances.

## 2025-06-20 NOTE — PROGRESS NOTES
Recreation Therapy Progress Note    Date: 6 20 2025    Time: 10:00 am group    Group Title: creative expression    Mood: depressed and anxious    Behavior:pt did participated in art and music class  . Pt art work showed a lot of anxiety .    Affect: pt depressed and anxious    Speech: pt quiet    Cognition: alert in group    Participation Level:pt completed 100% of wood work art project of her interests. Pt making a hope  box !! For her future goals. Pt was pleased with her completed work she dd this morning     Intervention:cont rt services          Recreation Therapist   Signature: javon nick MA CTRS

## 2025-06-20 NOTE — ASSESSMENT & PLAN NOTE
Patient was clear evidence of persistent ongoing difficulties in terms of mood and recurrent depression such at this time strong recommendations were made for evaluation for appropriate psychopharmacologic individuals and stabilization.  Patient to be evaluated for appropriate trials of medications to stabilize mood we will consider atypical antidepressant as patient has been on multiple trials of various agents.  We will also need to augment with trials of Remeron to facilitate improved sleep cycle.

## 2025-06-20 NOTE — HPI
30 yo CF admitted to the [facility] for MDD with SI. PMH of bipolar disorder off of medications, depression, sinus tachycardia, ADHD, and alcohol dependence, per chart. Labs and vitals significant for tachycardia, UDS + for amphetamines (rx for adderall), and elevated EtOH level. Pt denies any cp, sob, trouble tasting or smelling.

## 2025-06-20 NOTE — PROGRESS NOTES
Recreation Therapy Progress Note    Date: 6 20 2025    Time: 1400    Group Title: leisure skills    Mood: depressed and low energy    Behavior: pt participated in leisure skills group    Affect: pt depressed and low energy    Speech: pt very quiet    Cognition: alert but distracted and guarded    Participation Level: 100% in group and completed assignment at 100%    Intervention:  Cont rt services        Recreation Therapist   Signature: javon nick MA CTRS

## 2025-06-20 NOTE — CONSULTS
[facility] Hospital Medicine  Consult Note    Patient Name: Steph Alejandra  MRN: [id number]  Admission Date: 6/20/2025  Hospital Length of Stay: 0 days  Attending Physician: Oscar Zurita MD   Primary Care Provider: Mikala Primary Doctor           Patient information was obtained from patient and ER records.     Consults  Subjective:     Principal Problem: Major depression, recurrent    Chief Complaint: SI     HPI: 28 yo CF admitted to the [facility] for MDD with SI. PMH of bipolar disorder off of medications, depression, sinus tachycardia, ADHD, and alcohol dependence, per chart. Labs and vitals significant for tachycardia, UDS + for amphetamines (rx for adderall), and elevated EtOH level. Pt denies any cp, sob, trouble tasting or smelling.     Past Medical History:   Diagnosis Date    Bipolar disorder, unspecified        No past surgical history on file.    Review of patient's allergies indicates:  No Known Allergies    Current Facility-Administered Medications on File Prior to Encounter   Medication    [DISCONTINUED] zolpidem tablet 5 mg     Current Outpatient Medications on File Prior to Encounter   Medication Sig    ADDERALL 30 mg Tab Take 1 tablet twice a day by oral route as directed for 30 days.    metoprolol succinate (TOPROL-XL) 25 MG 24 hr tablet Take 1 tablet every day by oral route for 30 days.    SLYND 4 mg (28) Tab Take 1 tablet by mouth.    traZODone (DESYREL) 100 MG tablet Take 100 mg by mouth 2 (two) times daily.    furosemide (LASIX) 20 MG tablet Take 20 mg by mouth. FOR 7 DAYS    LAMICTAL 25 mg tablet Take 2 tablets every day by oral route for 30 days.    lumateperone (CAPLYTA) 10.5 mg Cap Take 1 capsule every day by oral route at bedtime.    medroxyPROGESTERone (PROVERA) 5 MG tablet Take 5 mg by mouth. FOR 7 DAYS    minoxidiL (ROGAINE) 2 % external solution APPLY 1 MILLILITER BY TOPICAL ROUTE 2 TIMES PER DAY , EVERY DAY, DIRECTLY ONTO THE SCALP IN THE HAIR LOSS  AREA     Family History    None       Tobacco Use    Smoking status: Never    Smokeless tobacco: Never   Substance and Sexual Activity    Alcohol use: Yes    Drug use: Not Currently    Sexual activity: Not on file     Review of Systems   Constitutional:  Negative for fever.   HENT: Negative.     Eyes: Negative.    Respiratory: Negative.  Negative for shortness of breath.    Cardiovascular:  Negative for chest pain.   Gastrointestinal:  Negative for abdominal distention, abdominal pain, nausea and vomiting.   Neurological: Negative.    Psychiatric/Behavioral:  Positive for behavioral problems, dysphoric mood and suicidal ideas.      Objective:     Vital Signs (Most Recent):  Temp: 98.1 °F (36.7 °C) (06/20/25 0958)  Pulse: 98 (06/20/25 0958)  Resp: 18 (06/20/25 0958)  BP: 131/85 (06/20/25 0958)  SpO2: 100 % (06/20/25 0958) Vital Signs (24h Range):  Temp:  [98.1 °F (36.7 °C)-98.5 °F (36.9 °C)] 98.1 °F (36.7 °C)  Pulse:  [] 98  Resp:  [18-20] 18  SpO2:  [98 %-100 %] 100 %  BP: (114-140)/(77-92) 131/85     Weight: 62.9 kg (138 lb 10.7 oz)  Body mass index is 25.36 kg/m².     Physical Exam  Vitals reviewed. Exam conducted with a chaperone present.   Constitutional:       General: She is not in acute distress.     Appearance: Normal appearance.   HENT:      Head: Normocephalic and atraumatic.      Nose: Nose normal.      Mouth/Throat:      Mouth: Mucous membranes are moist.   Eyes:      Extraocular Movements: Extraocular movements intact and EOM normal.      Conjunctiva/sclera: Conjunctivae normal.      Pupils: Pupils are equal, round, and reactive to light.   Cardiovascular:      Rate and Rhythm: Normal rate and regular rhythm.      Heart sounds: Normal heart sounds. No murmur heard.     No gallop.   Pulmonary:      Effort: Pulmonary effort is normal.      Breath sounds: Normal breath sounds. No wheezing, rhonchi or rales.   Musculoskeletal:         General: No swelling or deformity. Normal range of motion.       Cervical back: Normal range of motion and neck supple.   Skin:     General: Skin is warm and dry.   Neurological:      General: No focal deficit present.      Mental Status: She is alert.      Gait: Gait normal.   Psychiatric:         Attention and Perception: Attention normal.         Mood and Affect: Mood is depressed.         Speech: Speech normal.         Behavior: Behavior is not agitated or aggressive. Behavior is cooperative.         Cognition and Memory: Cognition normal.         CRANIAL NERVES     CN I  cranial nerve I not tested    CN II   Visual fields full to confrontation.     CN III, IV, VI   Pupils are equal, round, and reactive to light.  Extraocular motions are normal.   Right pupil: Accommodation: intact.   Left pupil: Accommodation: intact.   CN III: no CN III palsy  CN VI: no CN VI palsy    CN V   Facial sensation intact.   Right facial sensation deficit: none  Left facial sensation deficit: none    CN VII   Facial expression full, symmetric.   Right facial weakness: none  Left facial weakness: none    CN VIII   CN VIII normal.   Hearing: intact    CN IX, X   CN IX normal.   CN X normal.   Palate: symmetric    CN XI   CN XI normal.   Right sternocleidomastoid strength: normal  Left sternocleidomastoid strength: normal  Right trapezius strength: normal  Left trapezius strength: normal    CN XII   CN XII normal.   Tongue: not atrophic  Fasciculations: absent  Tongue deviation: none         Significant Labs: All pertinent labs within the past 24 hours have been reviewed.  Recent Lab Results         06/20/25  0304   06/20/25  0302   06/20/25  0256        Phencyclidine Negative           Albumin/Globulin Ratio   1.3         Acetaminophen Level   <3.0         Albumin   4.4         Alcohol, Serum   121.0  Comment: This assay is performed for medical purposes only.         ALP   41         ALT   17         Amphetamines, Urine Positive           Anion Gap   9.0         Appearance, UA     Clear       AST    17         Bacteria, UA     Occasional       Barbituates, Urine Negative           Baso #   0.04         Basophil %   0.5         Benzodiazepine, Urine Negative           Bilirubin (UA)     Negative       BILIRUBIN TOTAL   0.5         BUN   7.0         BUN/CREAT RATIO   10         Calcium   9.3         Cannabinoids, Urine Negative           Chloride   111         CO2   24         Cocaine, Urine Negative           Color, UA     Yellow       Creatinine   0.73         eGFR   >60  Comment: Estimated GFR calculated using the CKD-EPI creatinine (2021) equation.         Eos #   0.05         Eos %   0.6         Fentanyl, Urine Negative           Globulin, Total   3.5         Glucose   98         Glucose, UA     Negative       Hematocrit   37.9         Hemoglobin   13.7         Immature Grans (Abs)   0.02         Immature Granulocytes   0.2         Ketones, UA     Negative       Leukocyte Esterase, UA     Trace       Lymph #   2.61         LYMPH %   30.8         MCH   31.4         MCHC   36.1         MCV   86.7         MDMA, Urine Negative           Mono #   0.66         Mono %   7.8         MPV   9.3         Neut #   5.09         Neut %   60.1         NITRITE UA     Negative       nRBC   0.0         Blood, UA     1+       Opiates, Urine Negative           pH, UA     6.0       pH, Urine 6.0           Platelet Count   277         Potassium   4.0         hCG Qualitative, Urine     Negative       PROTEIN TOTAL   7.9         Protein, UA     Negative       RBC   4.37         RBC, UA     3-5       RDW   11.9         Salicylate Level   <5.0         Sodium   144         Specific Gravity,UA     <=1.005       Specific Gravity, Urine Auto <=1.005           Squam Epithel, UA     Few       Urobilinogen, UA     0.2       WBC, UA     6-10       WBC   8.47                 Significant Imaging: I have reviewed all pertinent imaging results/findings within the past 24 hours.  Assessment/Plan:     * Major depression, recurrent  Management per  psychiatrist.  Rec cessation of alcohol use.  VS and labs reviewed and stable.          Alcohol use disorder, severe, dependence  Recommend cessation.      Suicidal ideation          VTE Risk Mitigation (From admission, onward)      None                Thank you for your consult. I will sign off. Please contact us if you have any additional questions.    KIMBERLY MONTIEL,   Department of Hospital Medicine   [facility]

## 2025-06-20 NOTE — PLAN OF CARE
Problem: Suicide Risk  Goal: Absence of Self-Harm  Outcome: Not Progressing  Intervention: Assess Risk to Self and Maintain Safety  Flowsheets (Taken 6/20/2025 0635)  Behavior Management:   impulse control promoted   boundaries reinforced  Intervention: Promote Psychosocial Wellbeing  Flowsheets (Taken 6/20/2025 0635)  Supportive Measures:   active listening utilized   verbalization of feelings encouraged     Problem: Adult Behavioral Health Plan of Care  Goal: Plan of Care Review  Outcome: Progressing

## 2025-06-20 NOTE — ASSESSMENT & PLAN NOTE
Management per psychiatrist.  Rec cessation of alcohol use.  VS and labs reviewed and stable.

## 2025-06-20 NOTE — PLAN OF CARE
Problem: Violence Risk or Actual  Goal: Anger and Impulse Control  Outcome: Met     Problem: Suicide Risk  Goal: Absence of Self-Harm  Outcome: Met     Problem: Fall Injury Risk  Goal: Absence of Fall and Fall-Related Injury  Outcome: Met     Problem: Adult Behavioral Health Plan of Care  Goal: Plan of Care Review  Outcome: Met  Goal: Patient-Specific Goal (Individualization)  Outcome: Met  Goal: Adheres to Safety Considerations for Self and Others  Outcome: Met  Goal: Absence of New-Onset Illness or Injury  Outcome: Met  Goal: Optimized Coping Skills in Response to Life Stressors  Outcome: Met  Goal: Develops/Participates in Therapeutic [place] to Support Successful Transition  Outcome: Met     Problem: Excessive Substance Use  Goal: Optimized Energy Level (Excessive Substance Use)  Outcome: Met  Goal: Improved Behavioral Control (Excessive Substance Use)  Outcome: Met  Goal: Increased Participation and Engagement (Excessive Substance Use)  Outcome: Met  Goal: Improved Physiologic Symptoms (Excessive Substance Use)  Outcome: Met  Goal: Enhanced Social, Occupational or Functional Skills (Excessive Substance Use)  Outcome: Met     Problem: Depression  Goal: Improved Mood  Outcome: Met     Problem: Anxiety  Goal: Anxiety Reduction or Resolution  Outcome: Met     Problem: Violence Risk or Actual  Goal: Anger and Impulse Control  Outcome: Met     Problem: Suicide Risk  Goal: Absence of Self-Harm  Outcome: Met     Problem: Fall Injury Risk  Goal: Absence of Fall and Fall-Related Injury  Outcome: Met     Problem: Adult Behavioral Health Plan of Care  Goal: Plan of Care Review  Outcome: Met  Goal: Patient-Specific Goal (Individualization)  Outcome: Met  Goal: Adheres to Safety Considerations for Self and Others  Outcome: Met  Goal: Absence of New-Onset Illness or Injury  Outcome: Met  Goal: Optimized Coping Skills in Response to Life Stressors  Outcome: Met  Goal: Develops/Participates in Therapeutic [place] to Support  Successful Transition  Outcome: Met     Problem: Excessive Substance Use  Goal: Optimized Energy Level (Excessive Substance Use)  Outcome: Met  Goal: Improved Behavioral Control (Excessive Substance Use)  Outcome: Met  Goal: Increased Participation and Engagement (Excessive Substance Use)  Outcome: Met  Goal: Improved Physiologic Symptoms (Excessive Substance Use)  Outcome: Met  Goal: Enhanced Social, Occupational or Functional Skills (Excessive Substance Use)  Outcome: Met     Problem: Depression  Goal: Improved Mood  Outcome: Met     Problem: Anxiety  Goal: Anxiety Reduction or Resolution  Outcome: Met

## 2025-06-20 NOTE — SUBJECTIVE & OBJECTIVE
Patient History               Medical as of 6/20/2025       Past Medical History       Diagnosis Date Comments Source    Addiction to drug -- -- Provider    Alcohol abuse -- -- Provider    Depression -- -- Provider    History of psychiatric hospitalization -- -- Provider    Hx of psychiatric care -- -- Provider    Psychiatric problem -- -- Provider    PTSD (post-traumatic stress disorder) -- -- Provider    Sleep difficulties -- -- Provider    Suicide attempt -- -- Provider    Therapy -- -- Provider                          Surgical as of 6/20/2025    Past Surgical History: Patient provided no pertinent surgical history.               Family as of 6/20/2025    None               Tobacco Use as of 6/20/2025       Smoking Status Smoking Start Date Quit Date Current Packs/Day Average Packs/Day    Never -- -- --       Smokeless Status Smokeless Type Smokeless Quit Date    Never -- --      Source    Provider                  Alcohol Use as of 6/20/2025       Alcohol Use Drinks/Week Alcohol/Week Comments Source    Yes   -- -- Provider                  Drug Use as of 6/20/2025       Drug Use Types Frequency Comments Source    Not Currently -- -- -- Provider                  Sexual Activity as of 6/20/2025    None               Other Factors as of 6/20/2025    None               Social Documentation as of 6/20/2025    None               Occupational as of 6/20/2025    None               Socioeconomic as of 6/20/2025       Marital Status Spouse Name Number of Children Years Education Education Level Preferred Language Ethnicity Race Source     -- 3 -- -- English Not  or /a White Provider                  Pertinent History       Question Response Comments    Lives with family --    Place in Birth Order -- --    Lives in home --    Number of Siblings -- --    Raised by biological parents --    Legal Involvement -- --    Childhood Trauma early trauma --    Criminal History of arrest --    Financial  Status employed --    Highest Level of Education high school graduation --    Does patient have access to a firearm? No --     Service No --    Primary Leisure Activity -- --    Spirituality -- --          Past Medical History:   Diagnosis Date    Addiction to drug     Alcohol abuse     Depression     History of psychiatric hospitalization     Hx of psychiatric care     Psychiatric problem     PTSD (post-traumatic stress disorder)     Sleep difficulties     Suicide attempt     Therapy      No past surgical history on file.  Family History    None       Tobacco Use    Smoking status: Never    Smokeless tobacco: Never   Substance and Sexual Activity    Alcohol use: Yes    Drug use: Not Currently    Sexual activity: Not on file     Review of patient's allergies indicates:  No Known Allergies    Current Facility-Administered Medications on File Prior to Encounter   Medication    [DISCONTINUED] zolpidem tablet 5 mg     Current Outpatient Medications on File Prior to Encounter   Medication Sig    ADDERALL 30 mg Tab Take 1 tablet twice a day by oral route as directed for 30 days.    metoprolol succinate (TOPROL-XL) 25 MG 24 hr tablet Take 1 tablet every day by oral route for 30 days.    SLYND 4 mg (28) Tab Take 1 tablet by mouth.    traZODone (DESYREL) 100 MG tablet Take 100 mg by mouth 2 (two) times daily.    furosemide (LASIX) 20 MG tablet Take 20 mg by mouth. FOR 7 DAYS    LAMICTAL 25 mg tablet Take 2 tablets every day by oral route for 30 days.    lumateperone (CAPLYTA) 10.5 mg Cap Take 1 capsule every day by oral route at bedtime.    medroxyPROGESTERone (PROVERA) 5 MG tablet Take 5 mg by mouth. FOR 7 DAYS    minoxidiL (ROGAINE) 2 % external solution APPLY 1 MILLILITER BY TOPICAL ROUTE 2 TIMES PER DAY , EVERY DAY, DIRECTLY ONTO THE SCALP IN THE HAIR LOSS AREA     Psychotherapeutics (From admission, onward)      Start     Stop Route Frequency Ordered    06/20/25 2100  buPROPion TBSR 12 hr tablet 100 mg          "-- Oral 2 times daily 06/20/25 1622    06/20/25 2100  mirtazapine tablet 15 mg         -- Oral Nightly 06/20/25 1622    06/20/25 0900  LORazepam tablet 1 mg         -- Oral 4 times daily 06/20/25 0543    06/20/25 0543  OLANZapine zydis disintegrating tablet 10 mg         -- Oral Every 8 hours PRN 06/20/25 0543          Review of Systems   Psychiatric/Behavioral:  Positive for decreased concentration, dysphoric mood, sleep disturbance and suicidal ideas. The patient is nervous/anxious.      Strengths and Liabilities:  Strengths: Wants help, motivated for change   Weaknesses:  Limited support, poor adaptive skills    Objective:     Vital Signs (Most Recent):  Temp: 98.1 °F (36.7 °C) (06/20/25 0958)  Pulse: 92 (06/20/25 1438)  Resp: 18 (06/20/25 0958)  BP: 122/84 (06/20/25 1438)  SpO2: 100 % (06/20/25 0958) Vital Signs (24h Range):  Temp:  [98.1 °F (36.7 °C)-98.5 °F (36.9 °C)] 98.1 °F (36.7 °C)  Pulse:  [] 92  Resp:  [18-20] 18  SpO2:  [98 %-100 %] 100 %  BP: (114-140)/(77-92) 122/84     Height: 5' 2" (157.5 cm)  Weight: 62.9 kg (138 lb 10.7 oz)  Body mass index is 25.36 kg/m².    No intake or output data in the 24 hours ending 06/20/25 1632       Physical Exam    Mental status examination:  29-year-old white female who at this time presents with evidence of marked constricted in the overall affect.  Patient's speech is with good articulation and execution.  His thought processes were linear and able to be tracked.  His thought content demonstrated no clear evidence of any visual hallucinations.  There was no clear evidence of any auditory hallucinations.  She made no active statements to harm others.  She had intrusive thoughts to harm self.  Insight and judgment this time were deemed to be limited.    On cognitive evaluation patient was alert and oriented to person, place, setting and time.  Immediate memory is 3/3 objects immediately.  2/3 objects 5 minutes.  Long-term memory appeared to be intact.  Fund of " knowledge is deemed to be commensurate with the level education which is high school.  Insight and judgment deemed to be poor.        Significant Labs: Last 72 Hours:   Recent Lab Results         06/20/25  0304   06/20/25  0302   06/20/25  0256        Phencyclidine Negative           Albumin/Globulin Ratio   1.3         Acetaminophen Level   <3.0         Albumin   4.4         Alcohol, Serum   121.0  Comment: This assay is performed for medical purposes only.         ALP   41         ALT   17         Amphetamines, Urine Positive           Anion Gap   9.0         Appearance, UA     Clear       AST   17         Bacteria, UA     Occasional       Barbituates, Urine Negative           Baso #   0.04         Basophil %   0.5         Benzodiazepine, Urine Negative           Bilirubin (UA)     Negative       BILIRUBIN TOTAL   0.5         BUN   7.0         BUN/CREAT RATIO   10         Calcium   9.3         Cannabinoids, Urine Negative           Chloride   111         CO2   24         Cocaine, Urine Negative           Color, UA     Yellow       Creatinine   0.73         eGFR   >60  Comment: Estimated GFR calculated using the CKD-EPI creatinine (2021) equation.         Eos #   0.05         Eos %   0.6         Fentanyl, Urine Negative           Globulin, Total   3.5         Glucose   98         Glucose, UA     Negative       Hematocrit   37.9         Hemoglobin   13.7         Immature Grans (Abs)   0.02         Immature Granulocytes   0.2         Ketones, UA     Negative       Leukocyte Esterase, UA     Trace       Lymph #   2.61         LYMPH %   30.8         MCH   31.4         MCHC   36.1         MCV   86.7         MDMA, Urine Negative           Mono #   0.66         Mono %   7.8         MPV   9.3         Neut #   5.09         Neut %   60.1         NITRITE UA     Negative       nRBC   0.0         Blood, UA     1+       Opiates, Urine Negative           pH, UA     6.0       pH, Urine 6.0           Platelet Count   277          Potassium   4.0         hCG Qualitative, Urine     Negative       PROTEIN TOTAL   7.9         Protein, UA     Negative       RBC   4.37         RBC, UA     3-5       RDW   11.9         Salicylate Level   <5.0         Sodium   144         Specific Gravity,UA     <=1.005       Specific Gravity, Urine Auto <=1.005           Squam Epithel, UA     Few       Urobilinogen, UA     0.2       WBC, UA     6-10       WBC   8.47                 Significant Imaging: None

## 2025-06-20 NOTE — PROGRESS NOTES
Recreation Therapy Assessment    1. MOOD: pt is depressed and having SI thoughts    2. BEHAVIOR:cooperative    3. AFFECT:depressed and low energy    4. COGNITION: alert     5. MOTOR BEHAVIOR/SKILLS: ambulatory    6. SPEECH:normal    7. LEISURE FUNCTIONING: volleyball,skating,swimming,fishing,games with the children etc.     8. LEISURE NEEDS: to regain positive leisure skills in life that non substance related....    9. PT EDUCATION LEVEL: 12 grade and certified medical  assistance    10. WHAT IS THE BEST THING THAT EVER HAPPENED TO YOU? My children    11. THINGS THAT FRUSTRATE AND ANGER ME ARE: when I feel sick ! My mental illness has gotten worse.     12. WHEN I GET ANGRY, I USUALLY: walk away .talk to a friend or fuss..sometimes drink     13. MY RELATIONSHIP WITH MOST PEOPLE ARE: its ok and  but could be  a lot better.     14. LEISURE INTERESTS/SKILLS: I use to enjoy skating ,music, games, fishing, and volleyball.     15. LEISURE BARRIERS: SI thoughts  pt states.     16. ASSESSMENT SUMMARY: pt is a 29 year old white female. Pt lives in [facility] ,. Pt is  and has 3 children. Pt is a traveling medical assistant. Pt with has a good support system at home when she's working away she states. Pt pt ex  takes care of the kids. Pt lives with her brother. Pt mom is in [place] and dad is in [place]. Pt states she is closer to her dad. Mom is too far away in [facility]. Pt has been off her meds for 2 weeks . Pt has SI thoughts ,depression and anxiety.       17. [place] PLAN FOR RECREATION THERAPY:pt will receive rt services 2 x a day and 5 x a week with javon IRWINS . Pt will be assisted to complete 8 assignments   on problem solving skills ,emotional outlets ,self worth and leisure/social skills non substance related. Pt will utilize art,music ,cognitive games and exercise to achieve her goals. Assist pt 1;1 as needed to achieve her goals to the best of her ability. All to enhance positive decision  making skills,emotional outlets,self worth and leisure/social skills non substance related in daily life at home. To enhance her quality of life.       18. SIGNATURE/CREDENTIALS:javon nick MA CTRS                                                                     DATE:6 20 2025                              TIME:9:12 am         RECREATION THERAPIST  RANULFO
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
## 2025-06-20 NOTE — GROUP NOTE
"Group Psychotherapy       Group Focus: Stress Management     Group discussed Stress Management. Pt identified how their body, emotions, and behaviors change with stress. Group discussed coping skills. CBT utilized.      Number of patients in attendance: 7  Group Start Time: 0900  Group End Time:  0945  Groups Date: 6/20/2025  Group Topic:  Behavioral Health  Group Department: [facility]  Group Facilitators:  Sue You LCSW  _____________________________________________________________________    Patient Name: Steph Alejandra  MRN: [id number]  Patient Class: IP- Psych   Admission Date\Time: 6/20/2025  5:05 AM  Hospital Length of Stay: 0  Primary Care Provider: Mikala, Primary Doctor     Referred by: Acute Psychiatry Unit Treatment Team     Target symptoms: Alcohol Abuse, Depression, Anxiety, and Mood Disorder     Patient's response to treatment: Active Listening and Self-disclosure     Progress toward goals: Minimal progress    Plan: Continue treatment on APU    Pt reports to being depressed in group. Pt reports to isolating at home. Pt presents as depressed, anxious, sad and tearful. Pt cried off and on during group. Pt engaged with other group members when asked. Pt volunteered answers. Pt had moderate eye contact. Pt had a low tone of voice when answering questions. Pt reports that she does "get angry" when stressed for long periods of time.     "

## 2025-06-20 NOTE — PLAN OF CARE
Problem: Anxiety  Goal: Anxiety Reduction or Resolution  Outcome: Progressing     Problem: Suicide Risk  Goal: Absence of Self-Harm  Outcome: Progressing     Problem: Fall Injury Risk  Goal: Absence of Fall and Fall-Related Injury  Outcome: Progressing     Problem: Adult Behavioral Health Plan of Care  Goal: Plan of Care Review  Outcome: Progressing  Goal: Patient-Specific Goal (Individualization)  Outcome: Progressing  Goal: Adheres to Safety Considerations for Self and Others  Outcome: Progressing  Goal: Absence of New-Onset Illness or Injury  Outcome: Progressing  Goal: Optimized Coping Skills in Response to Life Stressors  Outcome: Progressing  Goal: Develops/Participates in Therapeutic [place] to Support Successful Transition  Outcome: Progressing  Goal: Rounds/Family Conference  Outcome: Progressing     Problem: Depression  Goal: Improved Mood  Outcome: Progressing     Problem: Depression  Goal: Improved Mood  Outcome: Progressing

## 2025-06-20 NOTE — ASSESSMENT & PLAN NOTE
Patient was evidence ongoing difficulties in terms of persistent ongoing thoughts to self-injurious this time were appeared to be at some risk to engage in his self-harm.  Strong recommendations are for patient to be maintain in highly structured environment events risk to self injure.  Patient will undergo active risk assessments throughout her hospitalization.

## 2025-06-20 NOTE — SUBJECTIVE & OBJECTIVE
Past Medical History:   Diagnosis Date    Bipolar disorder, unspecified        No past surgical history on file.    Review of patient's allergies indicates:  No Known Allergies    Current Facility-Administered Medications on File Prior to Encounter   Medication    [DISCONTINUED] zolpidem tablet 5 mg     Current Outpatient Medications on File Prior to Encounter   Medication Sig    ADDERALL 30 mg Tab Take 1 tablet twice a day by oral route as directed for 30 days.    metoprolol succinate (TOPROL-XL) 25 MG 24 hr tablet Take 1 tablet every day by oral route for 30 days.    SLYND 4 mg (28) Tab Take 1 tablet by mouth.    traZODone (DESYREL) 100 MG tablet Take 100 mg by mouth 2 (two) times daily.    furosemide (LASIX) 20 MG tablet Take 20 mg by mouth. FOR 7 DAYS    LAMICTAL 25 mg tablet Take 2 tablets every day by oral route for 30 days.    lumateperone (CAPLYTA) 10.5 mg Cap Take 1 capsule every day by oral route at bedtime.    medroxyPROGESTERone (PROVERA) 5 MG tablet Take 5 mg by mouth. FOR 7 DAYS    minoxidiL (ROGAINE) 2 % external solution APPLY 1 MILLILITER BY TOPICAL ROUTE 2 TIMES PER DAY , EVERY DAY, DIRECTLY ONTO THE SCALP IN THE HAIR LOSS AREA     Family History    None       Tobacco Use    Smoking status: Never    Smokeless tobacco: Never   Substance and Sexual Activity    Alcohol use: Yes    Drug use: Not Currently    Sexual activity: Not on file     Review of Systems   Constitutional:  Negative for fever.   HENT: Negative.     Eyes: Negative.    Respiratory: Negative.  Negative for shortness of breath.    Cardiovascular:  Negative for chest pain.   Gastrointestinal:  Negative for abdominal distention, abdominal pain, nausea and vomiting.   Neurological: Negative.    Psychiatric/Behavioral:  Positive for behavioral problems, dysphoric mood and suicidal ideas.      Objective:     Vital Signs (Most Recent):  Temp: 98.1 °F (36.7 °C) (06/20/25 0958)  Pulse: 98 (06/20/25 0958)  Resp: 18 (06/20/25 0958)  BP: 131/85  (06/20/25 0958)  SpO2: 100 % (06/20/25 0958) Vital Signs (24h Range):  Temp:  [98.1 °F (36.7 °C)-98.5 °F (36.9 °C)] 98.1 °F (36.7 °C)  Pulse:  [] 98  Resp:  [18-20] 18  SpO2:  [98 %-100 %] 100 %  BP: (114-140)/(77-92) 131/85     Weight: 62.9 kg (138 lb 10.7 oz)  Body mass index is 25.36 kg/m².     Physical Exam  Vitals reviewed. Exam conducted with a chaperone present.   Constitutional:       General: She is not in acute distress.     Appearance: Normal appearance.   HENT:      Head: Normocephalic and atraumatic.      Nose: Nose normal.      Mouth/Throat:      Mouth: Mucous membranes are moist.   Eyes:      Extraocular Movements: Extraocular movements intact and EOM normal.      Conjunctiva/sclera: Conjunctivae normal.      Pupils: Pupils are equal, round, and reactive to light.   Cardiovascular:      Rate and Rhythm: Normal rate and regular rhythm.      Heart sounds: Normal heart sounds. No murmur heard.     No gallop.   Pulmonary:      Effort: Pulmonary effort is normal.      Breath sounds: Normal breath sounds. No wheezing, rhonchi or rales.   Musculoskeletal:         General: No swelling or deformity. Normal range of motion.      Cervical back: Normal range of motion and neck supple.   Skin:     General: Skin is warm and dry.   Neurological:      General: No focal deficit present.      Mental Status: She is alert.      Gait: Gait normal.   Psychiatric:         Attention and Perception: Attention normal.         Mood and Affect: Mood is depressed.         Speech: Speech normal.         Behavior: Behavior is not agitated or aggressive. Behavior is cooperative.         Cognition and Memory: Cognition normal.         CRANIAL NERVES     CN I  cranial nerve I not tested    CN II   Visual fields full to confrontation.     CN III, IV, VI   Pupils are equal, round, and reactive to light.  Extraocular motions are normal.   Right pupil: Accommodation: intact.   Left pupil: Accommodation: intact.   CN III: no CN III  palsy  CN VI: no CN VI palsy    CN V   Facial sensation intact.   Right facial sensation deficit: none  Left facial sensation deficit: none    CN VII   Facial expression full, symmetric.   Right facial weakness: none  Left facial weakness: none    CN VIII   CN VIII normal.   Hearing: intact    CN IX, X   CN IX normal.   CN X normal.   Palate: symmetric    CN XI   CN XI normal.   Right sternocleidomastoid strength: normal  Left sternocleidomastoid strength: normal  Right trapezius strength: normal  Left trapezius strength: normal    CN XII   CN XII normal.   Tongue: not atrophic  Fasciculations: absent  Tongue deviation: none         Significant Labs: All pertinent labs within the past 24 hours have been reviewed.  Recent Lab Results         06/20/25  0304   06/20/25  0302   06/20/25  0256        Phencyclidine Negative           Albumin/Globulin Ratio   1.3         Acetaminophen Level   <3.0         Albumin   4.4         Alcohol, Serum   121.0  Comment: This assay is performed for medical purposes only.         ALP   41         ALT   17         Amphetamines, Urine Positive           Anion Gap   9.0         Appearance, UA     Clear       AST   17         Bacteria, UA     Occasional       Barbituates, Urine Negative           Baso #   0.04         Basophil %   0.5         Benzodiazepine, Urine Negative           Bilirubin (UA)     Negative       BILIRUBIN TOTAL   0.5         BUN   7.0         BUN/CREAT RATIO   10         Calcium   9.3         Cannabinoids, Urine Negative           Chloride   111         CO2   24         Cocaine, Urine Negative           Color, UA     Yellow       Creatinine   0.73         eGFR   >60  Comment: Estimated GFR calculated using the CKD-EPI creatinine (2021) equation.         Eos #   0.05         Eos %   0.6         Fentanyl, Urine Negative           Globulin, Total   3.5         Glucose   98         Glucose, UA     Negative       Hematocrit   37.9         Hemoglobin   13.7         Immature  Grans (Abs)   0.02         Immature Granulocytes   0.2         Ketones, UA     Negative       Leukocyte Esterase, UA     Trace       Lymph #   2.61         LYMPH %   30.8         MCH   31.4         MCHC   36.1         MCV   86.7         MDMA, Urine Negative           Mono #   0.66         Mono %   7.8         MPV   9.3         Neut #   5.09         Neut %   60.1         NITRITE UA     Negative       nRBC   0.0         Blood, UA     1+       Opiates, Urine Negative           pH, UA     6.0       pH, Urine 6.0           Platelet Count   277         Potassium   4.0         hCG Qualitative, Urine     Negative       PROTEIN TOTAL   7.9         Protein, UA     Negative       RBC   4.37         RBC, UA     3-5       RDW   11.9         Salicylate Level   <5.0         Sodium   144         Specific Gravity,UA     <=1.005       Specific Gravity, Urine Auto <=1.005           Squam Epithel, UA     Few       Urobilinogen, UA     0.2       WBC, UA     6-10       WBC   8.47                 Significant Imaging: I have reviewed all pertinent imaging results/findings within the past 24 hours.

## 2025-06-20 NOTE — NURSING
"Daily Nursing Note:      Behavior:    Patient (Steph Alejandra is a 29 y.o. female, : 1995, MRN: [id number]) demonstrating an affect that was sad. Steph demonstrating mood that is anxious. Steph had an appearance that was clean. Steph denies suicidal ideation. Steph denies suicide plan. Steph denies homicidal ideation. Steph denies hallucinations.    Steph's  height is 5' 2" (1.575 m) and weight is 62.9 kg (138 lb 10.7 oz). Her oral temperature is 98.1 °F (36.7 °C). Her blood pressure is 131/85 and her pulse is 98. Her respiration is 18 and oxygen saturation is 100%.     Nicoles last BM was noted on: 2025. Izabela is depressed. She is cooperative with treatment and medications.       Intervention:    Encourage Steph to perform self-hygiene, grooming, and changing of clothing. Monitor Steph's behavior and program compliance. Monitor Steph for suicidal ideation, homicidal ideation, sleep disturbance, and hallucinations. Encourage Steph to eat all portions of meals and assess for meal preferences.  Ensure hydration by offering fluids. Notify the Physician for any medication refusal and any change in patient condition.      Response:    Steph verbalizes understand of unit process and procedures. Steph is compliant with medications and treatment.    Plan:     Continue to monitor per MD orders; maintain patient safety. Q15min safety checks.  "

## 2025-06-20 NOTE — ED PROVIDER NOTES
Encounter Date: 6/20/2025       History     Chief Complaint   Patient presents with    Suicidal     Pt states has been off her Bi-polar meds for 3-4 months and has been having thoughts of harming self intermittently for a couple of weeks now. Pt notes no plan but states she had a similar episode in 2022 which at that time her plan was to take a lot of meds.       Pt states has been off her Bi-polar meds for 3-4 months and has been having thoughts of harming self intermittently for a couple of weeks now. Pt notes no plan but states she had a similar episode in 2022 which at that time her plan was to take a lot of meds.    The history is provided by the patient.   Mental Health Problem  The primary symptoms include suicidal ideas. This is a recurrent problem.   The degree of incapacity that she is experiencing as a consequence of her illness is moderate. Additional symptoms of the illness include anhedonia. She admits to suicidal ideas. She does not have a plan to attempt suicide. She contemplates harming themself. She has not already injured self. She does not contemplate injuring another person. She has not already  injured another person. Risk factors that are present for mental illness include a history of mental illness.     Review of patient's allergies indicates:  No Known Allergies  Past Medical History:   Diagnosis Date    Bipolar disorder, unspecified      History reviewed. No pertinent surgical history.  No family history on file.  Social History[1]  Review of Systems   Constitutional: Negative.    HENT: Negative.     Eyes: Negative.    Respiratory: Negative.     Cardiovascular: Negative.    Gastrointestinal: Negative.    Endocrine: Negative.    Genitourinary: Negative.    Musculoskeletal: Negative.    Skin: Negative.    Allergic/Immunologic: Negative.    Neurological: Negative.    Hematological: Negative.    Psychiatric/Behavioral:  Positive for suicidal ideas.    All other systems reviewed and are  negative.      Physical Exam     Initial Vitals [06/20/25 0234]   BP Pulse Resp Temp SpO2   (!) 140/92 (!) 127 18 98.5 °F (36.9 °C) 98 %      MAP       --         Physical Exam    Nursing note and vitals reviewed.  Constitutional: She appears well-developed and well-nourished.   HENT:   Head: Normocephalic and atraumatic.   Eyes: EOM are normal. Pupils are equal, round, and reactive to light.   Neck: Neck supple.   Normal range of motion.  Cardiovascular:  Normal rate, regular rhythm, normal heart sounds and intact distal pulses.           Pulmonary/Chest: Breath sounds normal.   Abdominal: Abdomen is soft. Bowel sounds are normal.   Musculoskeletal:         General: Normal range of motion.      Cervical back: Normal range of motion and neck supple.     Neurological: She is alert and oriented to person, place, and time. She has normal strength. GCS score is 15. GCS eye subscore is 4. GCS verbal subscore is 5. GCS motor subscore is 6.   Skin: Skin is warm and dry.   Psychiatric: Her speech is normal. She is withdrawn. Cognition and memory are normal. She expresses impulsivity. She exhibits a depressed mood. She expresses suicidal ideation.         ED Course   Procedures  Labs Reviewed   COMPREHENSIVE METABOLIC PANEL - Abnormal       Result Value    Sodium 144      Potassium 4.0      Chloride 111 (*)     CO2 24      Glucose 98      Blood Urea Nitrogen 7.0      Creatinine 0.73      Calcium 9.3      Protein Total 7.9      Albumin 4.4      Globulin 3.5      Albumin/Globulin Ratio 1.3      Bilirubin Total 0.5      ALP 41      ALT 17      AST 17      eGFR >60      Anion Gap 9.0      BUN/Creatinine Ratio 10     URINALYSIS, REFLEX TO URINE CULTURE - Abnormal    Color, UA Yellow      Appearance, UA Clear      Specific Gravity, UA <=1.005      pH, UA 6.0      Protein, UA Negative      Glucose, UA Negative      Ketones, UA Negative      Blood, UA 1+ (*)     Bilirubin, UA Negative      Urobilinogen, UA 0.2      Nitrites, UA  Negative      Leukocyte Esterase, UA Trace (*)    DRUG SCREEN, URINE (BEAKER) - Abnormal    Amphetamines, Urine Positive (*)     Barbiturates, Urine Negative      Benzodiazepine, Urine Negative      Cannabinoids, Urine Negative      Cocaine, Urine Negative      Fentanyl, Urine Negative      MDMA, Urine Negative      Opiates, Urine Negative      Phencyclidine, Urine Negative      pH, Urine 6.0      Specific Gravity, Urine Auto <=1.005      Narrative:     Cut off concentrations:    Amphetamines - 1000 ng/ml  Barbiturates - 200 ng/ml  Benzodiazepine - 200 ng/ml  Cannabinoids (THC) - 50 ng/ml  Cocaine - 300 ng/ml  Fentanyl - 1.0 ng/ml  MDMA - 500 ng/ml  Opiates - 300 ng/ml   Phencyclidine (PCP) - 25 ng/ml    Specimen submitted for drug analysis and tested for pH and specific gravity in order to evaluate sample integrity. Suspect tampering if specific gravity is <1.003 and/or pH is not within the range of 4.5 - 8.0  False negatives may result form substances such as bleach added to urine.  False positives may result for the presence of a substance with similar chemical structure to the drug or its metabolite.    This test provides only a PRELIMINARY analytical test result. A more specific alternate chemical method must be used in order to obtain a confirmed analytical result. Gas chromatography/mass spectrometry (GC/MS) is the preferred confirmatory method. Other chemical confirmation methods are available. Clinical consideration and professional judgement should be applied to any drug of abuse test result, particularly when preliminary positive results are used.    Positive results will be confirmed only at the physicians request. Unconfirmed screening results are to be used only for medical purposes (treatment).        ALCOHOL,MEDICAL (ETHANOL) - Abnormal    Ethanol Level 121.0 (*)    ACETAMINOPHEN LEVEL - Abnormal    Acetaminophen Level <3.0 (*)    SALICYLATE LEVEL - Abnormal    Salicylate Level <5.0 (*)    CBC WITH  DIFFERENTIAL - Abnormal    WBC 8.47      RBC 4.37      Hgb 13.7      Hct 37.9      MCV 86.7      MCH 31.4 (*)     MCHC 36.1 (*)     RDW 11.9      Platelet 277      MPV 9.3      Neut % 60.1      Lymph % 30.8      Mono % 7.8      Eos % 0.6      Basophil % 0.5      Imm Grans % 0.2      Neut # 5.09      Lymph # 2.61      Mono # 0.66      Eos # 0.05      Baso # 0.04      Imm Gran # 0.02      NRBC% 0.0     URINALYSIS, MICROSCOPIC - Abnormal    Bacteria, UA Occasional      RBC, UA 3-5      WBC, UA 6-10 (*)     Squamous Epithelial Cells, UA Few (*)    PREGNANCY TEST, URINE RAPID - Normal    hCG Qualitative, Urine Negative     CBC W/ AUTO DIFFERENTIAL    Narrative:     The following orders were created for panel order CBC auto differential.  Procedure                               Abnormality         Status                     ---------                               -----------         ------                     CBC with Differential[[id number]]       Abnormal            Final result                 Please view results for these tests on the individual orders.          Imaging Results    None          Medications   zolpidem tablet 5 mg (has no administration in time range)     Medical Decision Making  Suicidal. No plan, but admits to wanting to hurt herself.    Amount and/or Complexity of Data Reviewed  Labs: ordered.    Risk  Prescription drug management.                  Medically cleared for psychiatry placement: 6/20/2025  2:47 AM                   Clinical Impression:  Final diagnoses:  [R45.851] Suicidal ideation (Primary)          ED Disposition Condition    Transfer to Psych Facility Stable          ED Prescriptions    None       Follow-up Information    None              Estuardo Mcgovern MD  06/20/25 0247         [1]   Social History  Tobacco Use    Smoking status: Never    Smokeless tobacco: Never   Substance Use Topics    Alcohol use: Yes    Drug use: Not Currently        Estuardo Mcgovern MD  06/20/25 2259

## 2025-06-21 LAB
CHOLEST SERPL-MCNC: 125 MG/DL
CHOLEST/HDLC SERPL: 2 {RATIO} (ref 0–5)
EST. AVERAGE GLUCOSE BLD GHB EST-MCNC: 85.3 MG/DL
GLUCOSE P FAST SERPL-MCNC: 87 MG/DL (ref 70–100)
HBA1C MFR BLD: 4.6 %
HDLC SERPL-MCNC: 59 MG/DL (ref 35–60)
LDLC SERPL CALC-MCNC: 55 MG/DL (ref 50–140)
T PALLIDUM AB SER QL: NONREACTIVE
T3FREE SERPL-MCNC: 3.07 PG/ML (ref 1.58–3.91)
T4 FREE SERPL-MCNC: 1.06 NG/DL (ref 0.7–1.48)
TRIGL SERPL-MCNC: 57 MG/DL (ref 37–140)
TSH SERPL-ACNC: 0.73 UIU/ML (ref 0.35–4.94)
VLDLC SERPL CALC-MCNC: 11 MG/DL

## 2025-06-21 PROCEDURE — 84443 ASSAY THYROID STIM HORMONE: CPT | Performed by: PSYCHIATRY & NEUROLOGY

## 2025-06-21 PROCEDURE — 25000003 PHARM REV CODE 250: Performed by: INTERNAL MEDICINE

## 2025-06-21 PROCEDURE — 82947 ASSAY GLUCOSE BLOOD QUANT: CPT | Performed by: PSYCHIATRY & NEUROLOGY

## 2025-06-21 PROCEDURE — 36415 COLL VENOUS BLD VENIPUNCTURE: CPT | Performed by: PSYCHIATRY & NEUROLOGY

## 2025-06-21 PROCEDURE — 84439 ASSAY OF FREE THYROXINE: CPT | Performed by: PSYCHIATRY & NEUROLOGY

## 2025-06-21 PROCEDURE — 84481 FREE ASSAY (FT-3): CPT | Performed by: PSYCHIATRY & NEUROLOGY

## 2025-06-21 PROCEDURE — 25000003 PHARM REV CODE 250: Performed by: PSYCHIATRY & NEUROLOGY

## 2025-06-21 PROCEDURE — 86780 TREPONEMA PALLIDUM: CPT | Performed by: PSYCHIATRY & NEUROLOGY

## 2025-06-21 PROCEDURE — 80061 LIPID PANEL: CPT | Performed by: PSYCHIATRY & NEUROLOGY

## 2025-06-21 PROCEDURE — 83036 HEMOGLOBIN GLYCOSYLATED A1C: CPT | Performed by: PSYCHIATRY & NEUROLOGY

## 2025-06-21 PROCEDURE — 11400000 HC PSYCH PRIVATE ROOM

## 2025-06-21 RX ADMIN — MIRTAZAPINE 15 MG: 15 TABLET, FILM COATED ORAL at 08:06

## 2025-06-21 RX ADMIN — LORAZEPAM 1 MG: 1 TABLET ORAL at 08:06

## 2025-06-21 RX ADMIN — LORAZEPAM 1 MG: 1 TABLET ORAL at 05:06

## 2025-06-21 RX ADMIN — LORAZEPAM 1 MG: 1 TABLET ORAL at 12:06

## 2025-06-21 RX ADMIN — BUPROPION HYDROCHLORIDE 100 MG: 100 TABLET, FILM COATED, EXTENDED RELEASE ORAL at 08:06

## 2025-06-21 RX ADMIN — METOPROLOL SUCCINATE 25 MG: 25 TABLET, EXTENDED RELEASE ORAL at 08:06

## 2025-06-21 NOTE — NURSING
2100. AAO and depressed. VS Q4hr while awake. Displayed no C/O. Admitted to Depression=5 and Anxiety=7. Denied any SI/HI/AH/VH. Accepted all PM meds as prescribed by Provider. In room resting quietly.

## 2025-06-21 NOTE — PLAN OF CARE
Problem: Depression  Goal: Improved Mood  Outcome: Progressing  Intervention: Monitor and Manage Depressive Symptoms  Flowsheets (Taken 6/20/2025 2020)  Supportive Measures:   active listening utilized   verbalization of feelings encouraged     Problem: Anxiety  Goal: Anxiety Reduction or Resolution  Outcome: Progressing  Intervention: Promote Anxiety Reduction  Flowsheets (Taken 6/20/2025 2020)  Supportive Measures:   active listening utilized   verbalization of feelings encouraged     Problem: Seizure, Active Management  Goal: Absence of Seizure/Seizure-Related Injury  Outcome: Progressing  Intervention: Prevent Seizure-Related Injury  Flowsheets (Taken 6/20/2025 2020)  Seizure Precautions: activity supervised

## 2025-06-21 NOTE — NURSING
"Daily Nursing Note:      Behavior:    Patient (Steph Alejandra is a 29 y.o. female, : 1995, MRN: [id number]) demonstrating an affect that was sad, flat, and anxious. Steph demonstrating mood that is depressed and anxious. Steph had an appearance that was clean. Steph denies suicidal ideation. Steph denies suicide plan. Steph denies homicidal ideation. Steph denies hallucinations.    Steph's  height is 5' 2" (1.575 m) and weight is 62.9 kg (138 lb 10.7 oz). Her oral temperature is 98.3 °F (36.8 °C). Her blood pressure is 120/78 and her pulse is 88. Her respiration is 16 and oxygen saturation is 99%.   Nicoles last BM was noted on: 2025.      Intervention:    Encouraged Steph to perform self-hygiene, grooming, and changing of clothing. Monitored Steph's behavior and program compliance. Monitored Steph for suicidal ideation, homicidal ideation, sleep disturbance, and hallucinations. Encouraged Steph to eat all portions of meals and assesse for meal preferences. Monitored Steph for intake and output to ensure hydration. Will notify the Physician for any medication refusal and any change in patient condition.      Response:    Steph is flat, quiet, and withdrawn.  Endorses anxiety and rates anxiety 5/10.  Endorses depression rating it as 10/10 in intensity.  Compliant with meds.  No interaction with peers or staff.  Answers questions with nods or one word responses.        Plan:     Continue to monitor per MD orders; maintain patient safety with safety checks Q15 minutes and prn.   "

## 2025-06-21 NOTE — PROGRESS NOTES
HD # 1    29-year-old white female who at this time appears to be making adequate adjustments inpatient unit.  She appears at this time to be quite sullen sad and downcast.  Maritza's has a overall very little spontaneity today.  With a attempting to engage her today Maritza's does not establish very much eye contact states Maritza's just a still tired with a past week or so when which she would not having any asleep whatsoever.    Her mood continues to be extremely down.      On mental status examination time assessment this is a 29-year-old white female whose affect at this time was markedly blunted to flat.  Whose speech was with good articulation and execution.  His thought processes this time were not spontaneous.  His thought content demonstrated no clear evidence of any auditory or visual hallucinations.  Patient is making no active statements to harm self.  Patient made no active statements to harm others.  Overall insight and judgment this time were deemed to be limited poor.    Impression:  Suicide ideations   Major depressive disorder recurrent severe without psychotic features   PTSD secondary to childhood trauma     Estimated continued hospitalization greater than 96 hours.    Indications: Patient was time presents active ongoing mood instability dysphoria at this time could not safely be maintained at a lower level of care     Recommendations:   1.  Continue trials in reductions of trials of Wellbutrin  mg p.o. b.i.d..    2. Continue with trials of Ativan 1 mg p.o. q.i.d. as patient does give an equivocal history as to the intensity of which she maybe drinking.  Certainly has a high index of suspicion that she might drink more than Maritza's indicated.    3. We will continue with trials of Remeron 15 mg p.o. q.h.s..  Patient states she did not have any difficulty with the dosing level.  4.  We will get additional collateral information and mood treatment forward with  returns.   Hopefully to be able have some additional collateral information to offer clarity in terms of strategies in the might be useful in terms of assisting patient in terms of stabilization of her mood.

## 2025-06-21 NOTE — NURSING
"Daily Nursing Note:      Behavior:    Patient (Steph Alejandra is a 29 y.o. female, : 1995, MRN: [id number]) demonstrating an affect that was sad. Steph demonstrating mood that is depressed and anxious. Steph had an appearance that was disheveled. Steph denies suicidal ideation. Steph denies suicide plan. Steph denies homicidal ideation. Steph denies hallucinations.    Steph's  height is 5' 2" (1.575 m) and weight is 62.9 kg (138 lb 10.7 oz). Her oral temperature is 98.2 °F (36.8 °C). Her blood pressure is 109/74 and her pulse is 78. Her respiration is 18 and oxygen saturation is 99%.     Nicoles last BM was noted on: 2025. She isolates in her room. "I'm just tired. I haven't slept in days." Slept well last night, 9 hours.        Intervention:    Encourage Steph to perform self-hygiene, grooming, and changing of clothing. Monitor Steph's behavior and program compliance. Monitor Steph for suicidal ideation, homicidal ideation, sleep disturbance, and hallucinations. Encourage Steph to eat all portions of meals and assess for meal preferences.  Ensure hydration by offering fluids. Notify the Physician for any medication refusal and any change in patient condition.      Response:    Steph verbalizes understand of unit process and procedures. Steph is compliant with medications and treatment.    Plan:     Continue to monitor per MD orders; maintain patient safety. Q15min safety checks.  "

## 2025-06-22 PROCEDURE — 25000003 PHARM REV CODE 250: Performed by: PSYCHIATRY & NEUROLOGY

## 2025-06-22 PROCEDURE — 25000003 PHARM REV CODE 250: Performed by: INTERNAL MEDICINE

## 2025-06-22 PROCEDURE — 11400000 HC PSYCH PRIVATE ROOM

## 2025-06-22 RX ADMIN — IBUPROFEN 400 MG: 400 TABLET, FILM COATED ORAL at 04:06

## 2025-06-22 RX ADMIN — LORAZEPAM 1 MG: 1 TABLET ORAL at 01:06

## 2025-06-22 RX ADMIN — METOPROLOL SUCCINATE 25 MG: 25 TABLET, EXTENDED RELEASE ORAL at 08:06

## 2025-06-22 RX ADMIN — LORAZEPAM 1 MG: 1 TABLET ORAL at 08:06

## 2025-06-22 RX ADMIN — MIRTAZAPINE 15 MG: 15 TABLET, FILM COATED ORAL at 08:06

## 2025-06-22 RX ADMIN — BUPROPION HYDROCHLORIDE 100 MG: 100 TABLET, FILM COATED, EXTENDED RELEASE ORAL at 08:06

## 2025-06-22 RX ADMIN — LORAZEPAM 1 MG: 1 TABLET ORAL at 04:06

## 2025-06-22 NOTE — NURSING
2200. AAO and depressed. Displayed no C/O. Admitted to Depression=3 and Anxiety=4. Denied any SI/HI/AH/VH. Accepted all PM meds as prescribed by Provider. In room resting quietly.

## 2025-06-22 NOTE — NURSING
"Daily Nursing Note:      Behavior:    Patient (Steph Alejandra is a 29 y.o. female, : 1995, MRN: [id number]) demonstrating an affect that was flat. Steph demonstrating mood that is depressed. Steph had an appearance that was disheveled. Steph denies suicidal ideation. Steph denies suicide plan. Steph denies homicidal ideation. Steph denies hallucinations.    Steph's  height is 5' 2" (1.575 m) and weight is 62.9 kg (138 lb 10.7 oz). Her oral temperature is 98.1 °F (36.7 °C). Her blood pressure is 107/73 and her pulse is 78. Her respiration is 16 and oxygen saturation is 99%.     Nicoles last BM was noted on: 2025  Steph stays in bed, minimal interaction with staff or peers. Offered shower, states "maybe later."      Intervention:    Encourage Steph to perform self-hygiene, grooming, and changing of clothing. Monitor Steph's behavior and program compliance. Monitor Steph for suicidal ideation, homicidal ideation, sleep disturbance, and hallucinations. Encourage Steph to eat all portions of meals and assess for meal preferences.  Ensure hydration by offering fluids. Notify the Physician for any medication refusal and any change in patient condition.      Response:    Steph verbalizes understand of unit process and procedures. Steph is compliant with medications and treatment.    Plan:     Continue to monitor per MD orders; maintain patient safety. Q15min safety checks. Suicide, fall and seizure precautions.  "

## 2025-06-22 NOTE — PLAN OF CARE
Problem: Suicide Risk  Goal: Absence of Self-Harm  Outcome: Progressing     Problem: Fall Injury Risk  Goal: Absence of Fall and Fall-Related Injury  Outcome: Progressing     Problem: Adult Behavioral Health Plan of Care  Goal: Plan of Care Review  Outcome: Progressing  Goal: Patient-Specific Goal (Individualization)  Outcome: Progressing  Goal: Adheres to Safety Considerations for Self and Others  Outcome: Progressing  Goal: Absence of New-Onset Illness or Injury  Outcome: Progressing  Goal: Optimized Coping Skills in Response to Life Stressors  Outcome: Progressing  Goal: Develops/Participates in Therapeutic [place] to Support Successful Transition  Outcome: Progressing  Goal: Rounds/Family Conference  Outcome: Progressing

## 2025-06-23 PROCEDURE — 25000003 PHARM REV CODE 250: Performed by: PSYCHIATRY & NEUROLOGY

## 2025-06-23 PROCEDURE — 11400000 HC PSYCH PRIVATE ROOM

## 2025-06-23 PROCEDURE — 25000003 PHARM REV CODE 250: Performed by: INTERNAL MEDICINE

## 2025-06-23 RX ORDER — MIRTAZAPINE 15 MG/1
30 TABLET, FILM COATED ORAL NIGHTLY
Status: DISCONTINUED | OUTPATIENT
Start: 2025-06-23 | End: 2025-06-24

## 2025-06-23 RX ORDER — BUPROPION HYDROCHLORIDE 150 MG/1
150 TABLET ORAL DAILY
Status: DISCONTINUED | OUTPATIENT
Start: 2025-06-23 | End: 2025-06-28 | Stop reason: HOSPADM

## 2025-06-23 RX ORDER — LORAZEPAM 0.5 MG/1
0.5 TABLET ORAL 4 TIMES DAILY
Status: DISCONTINUED | OUTPATIENT
Start: 2025-06-23 | End: 2025-06-24

## 2025-06-23 RX ORDER — TRAZODONE HYDROCHLORIDE 50 MG/1
50 TABLET ORAL NIGHTLY
Status: DISCONTINUED | OUTPATIENT
Start: 2025-06-23 | End: 2025-06-24

## 2025-06-23 RX ADMIN — LORAZEPAM 0.5 MG: 0.5 TABLET ORAL at 12:06

## 2025-06-23 RX ADMIN — TRAZODONE HYDROCHLORIDE 50 MG: 50 TABLET ORAL at 08:06

## 2025-06-23 RX ADMIN — MIRTAZAPINE 30 MG: 15 TABLET, FILM COATED ORAL at 08:06

## 2025-06-23 RX ADMIN — LORAZEPAM 0.5 MG: 0.5 TABLET ORAL at 04:06

## 2025-06-23 RX ADMIN — HYDROXYZINE PAMOATE 50 MG: 25 CAPSULE ORAL at 10:06

## 2025-06-23 RX ADMIN — BUPROPION HYDROCHLORIDE 100 MG: 100 TABLET, FILM COATED, EXTENDED RELEASE ORAL at 08:06

## 2025-06-23 RX ADMIN — METOPROLOL SUCCINATE 25 MG: 25 TABLET, EXTENDED RELEASE ORAL at 08:06

## 2025-06-23 RX ADMIN — ACETAMINOPHEN 650 MG: 325 TABLET ORAL at 08:06

## 2025-06-23 RX ADMIN — LORAZEPAM 0.5 MG: 0.5 TABLET ORAL at 08:06

## 2025-06-23 RX ADMIN — LORAZEPAM 1 MG: 1 TABLET ORAL at 08:06

## 2025-06-23 NOTE — PROGRESS NOTES
HD  # 3     29-year-old white female who today was interviewed.  She continues to appeared at this time very downcast and sad and shows very little spontaneity.  Staff reports she has been is a tremendous amount of time staying to herself in his room.  She reports she does get out and go to some groups.  Her level of participation though appears to be not as robust as 1 would hope.      On report regarding her medications she is not reporting any major difficulties in terms of adverse effects with the medications.    On mental status examination: 29-year-old white female who at this time presents with a constricted in his overall affect.  Whose speech is with good articulation and execution.  His thought processes this time were non spontaneous but when produced were logical and could be tracked.  Her thought content shows continued difficulties in terms of preoccupation with the prior events and prior losses.  Her insight and judgment this time were deemed to be limited.      Cognition was intact.      Impression:  Suicide ideations   Major depressive disorder recurrent severe without psychotic features  Alcohol use disorder     Estimated length of continued hospitalization:  72-96 hours     Indications: Patient was time presents with the ongoing challenges in terms of marked mood instability, asleep failure difficulties in terms of withdrawal from alcohol and continued mood instability in his not achieved adequate stabilization with current medication trials    Recommendations:  1. We will stop Wellbutrin SR.  2.  We will give Wellbutrin  mg p.o. q.day.  3. Cut doses of Ativan to 0.5 mg p.o. q.i.d. we will reassess for difficulties in terms of detoxification   4. We will add trazodone to augment Remeron at this time.  We will be begun on trials of trazodone 50 mg p.o. q.h.s. as she reports she had marked difficulties in terms of getting asleep and staying asleep last night.  Additionally we will increase  doses of Remeron 30 mg p.o. q.h.s..    Patient's overall response to treatment that has not been robust.

## 2025-06-23 NOTE — PLAN OF CARE
Problem: Suicide Risk  Goal: Absence of Self-Harm  Outcome: Progressing     Problem: Fall Injury Risk  Goal: Absence of Fall and Fall-Related Injury  Outcome: Progressing     Problem: Adult Behavioral Health Plan of Care  Goal: Plan of Care Review  Outcome: Progressing  Goal: Patient-Specific Goal (Individualization)  Outcome: Progressing  Goal: Adheres to Safety Considerations for Self and Others  Outcome: Progressing  Goal: Absence of New-Onset Illness or Injury  Outcome: Progressing  Goal: Optimized Coping Skills in Response to Life Stressors  Outcome: Progressing  Intervention: Promote Effective Coping Strategies  Flowsheets (Taken 6/23/2025 4149)  Supportive Measures:   active listening utilized   verbalization of feelings encouraged     Problem: Anxiety  Goal: Anxiety Reduction or Resolution  Outcome: Ongoing     Problem: Seizure, Active Management  Goal: Absence of Seizure/Seizure-Related Injury  Outcome: Progressing

## 2025-06-23 NOTE — PROGRESS NOTES
Recreation Therapy Progress Note    Date: 6 23 2025    Time: 10;00 am group    Group Title: creative expression    Mood: depressed      Behavior: pt participated in art and music     Affect: depressed    Speech: pt very quiet in group    Cognition: alert on assignment    Participation Level: 100% completed work. Pt art showed depression and anxiety    Intervention:cont rt services.          Recreation Therapist   Signature: javon nick MA CTRS

## 2025-06-23 NOTE — PROGRESS NOTES
Recreation Therapy Progress Note    Date: 6 23 2025    Time: 1400    Group Title: leisure skills    Mood: depressed and low energy    Behavior: participated in cognitive game    Affect: depressed    Speech: pt very  quiet    Cognition: alert in game    Participation Level: 100% in group     Intervention:cont rt services          Recreation Therapist   Signature: javon IRWINS

## 2025-06-23 NOTE — NURSING
"Daily Nursing Note:      Behavior:    Patient (Steph Alejandra is a 29 y.o. female, : 1995, MRN: [id number]) demonstrating an affect that was sad, flat, and anxious. Steph demonstrating mood that is depressed and anxious. Steph had an appearance that was disheveled. Steph denies suicidal ideation. Steph denies suicide plan. Steph denies homicidal ideation. Steph denies hallucinations. She rates her anxiety 3/10 and depression 2/10. She reports to having trouble falling asleep last night. Withdrawn and avoidant with poor eye contact. She denies any thoughts to self harm.    Steph's  height is 5' 2" (1.575 m) and weight is 63 kg (138 lb 14.2 oz). Her oral temperature is 97.9 °F (36.6 °C). Her blood pressure is 113/76 and her pulse is 62. Her respiration is 18 and oxygen saturation is 99%. Nicoles last BM was noted on: 25    Intervention:    Encourage Steph to perform self-hygiene, grooming, and changing of clothing. Monitor Steph's behavior and program compliance. Monitor Steph for suicidal ideation, homicidal ideation, sleep disturbance, and hallucinations. Encourage Steph to eat all portions of meals and assess for meal preferences. Monitor Steph for intake and output to ensure hydration. Notify the Physician for any medication refusal and any change in patient condition.      Response:    Steph verbalizes understanding of unit process and procedures. She is compliant with medications. Remeron was increased to 15 mg and Trazadone added. Will continue to monitor mood and behavior, offer emotional support and encourage increased socialization.    Plan:     Continue to monitor per MD orders; maintain patient safety. Q 15 min safety checks.     "

## 2025-06-23 NOTE — NURSING
2200. AAO and flat. Displayed no C/O. Admitted to Depression=0 and Anxiety=0. Denied any SI/HI/AH/VH. Accepted all PM meds as prescribed by Provider. In room resting quietly.

## 2025-06-23 NOTE — PLAN OF CARE
Problem: Anxiety  Goal: Anxiety Reduction or Resolution  Outcome: Progressing     Problem: Suicide Risk  Goal: Absence of Self-Harm  Outcome: Progressing     Problem: Fall Injury Risk  Goal: Absence of Fall and Fall-Related Injury  Outcome: Progressing     Problem: Adult Behavioral Health Plan of Care  Goal: Plan of Care Review  Outcome: Progressing  Goal: Patient-Specific Goal (Individualization)  Outcome: Progressing  Goal: Adheres to Safety Considerations for Self and Others  Outcome: Progressing  Goal: Absence of New-Onset Illness or Injury  Outcome: Progressing  Goal: Optimized Coping Skills in Response to Life Stressors  Outcome: Progressing  Goal: Develops/Participates in Therapeutic [place] to Support Successful Transition  Outcome: Progressing  Goal: Rounds/Family Conference  Outcome: Progressing     Problem: Depression  Goal: Improved Mood  Outcome: Progressing     Problem: Seizure, Active Management  Goal: Absence of Seizure/Seizure-Related Injury  Outcome: Progressing

## 2025-06-23 NOTE — GROUP NOTE
"Group Psychotherapy       Group Focus: Life Skills      Group discussed "Safe Space". Group identified safe people, safe places, and safe activities. Group discussed emotions and sleep patterns. SBFT utilized.     Number of patients in attendance: 6  Group Start Time: 0900  Group End Time:  0945  Groups Date: 6/23/2025  Group Topic:  Behavioral Health  Group Department: [facility] - [facility]  Group Facilitators:  Sue You LCSW  _____________________________________________________________________    Patient Name: Steph Alejandra  MRN: [id number]  Patient Class: IP- Psych   Admission Date\Time: 6/20/2025  5:05 AM  Hospital Length of Stay: 3  Primary Care Provider: Mikala, Primary Doctor     Referred by: Acute Psychiatry Unit Treatment Team     Target symptoms: Alcohol Abuse, Depression, Anxiety, and Mood Disorder     Patient's response to treatment: Active Listening      Progress toward goals: Minimal progress       Plan: Continue treatment on APU    Pt presents as guarded, anxious, agitated and depressed. Pt would look irritable when other pt was speaking. Pt only spoke when asked. Pt had minimal worded answers. Pt reports she is "tired". Pt reports her safe space is "my bed" and her safe person is "my brother". Pt reports to sleeping from 9pm to 11am at home. Pt would look strongly at another pt when she was monopolizing group time.     "

## 2025-06-24 PROCEDURE — 25000003 PHARM REV CODE 250: Performed by: PSYCHIATRY & NEUROLOGY

## 2025-06-24 PROCEDURE — 11400000 HC PSYCH PRIVATE ROOM

## 2025-06-24 PROCEDURE — 25000003 PHARM REV CODE 250: Performed by: INTERNAL MEDICINE

## 2025-06-24 RX ORDER — TRAZODONE HYDROCHLORIDE 50 MG/1
100 TABLET ORAL NIGHTLY
Status: DISCONTINUED | OUTPATIENT
Start: 2025-06-24 | End: 2025-06-26

## 2025-06-24 RX ORDER — BUSPIRONE HYDROCHLORIDE 5 MG/1
5 TABLET ORAL 3 TIMES DAILY
Status: DISCONTINUED | OUTPATIENT
Start: 2025-06-24 | End: 2025-06-26

## 2025-06-24 RX ORDER — LORAZEPAM 0.5 MG/1
0.5 TABLET ORAL 2 TIMES DAILY
Status: DISCONTINUED | OUTPATIENT
Start: 2025-06-24 | End: 2025-06-26

## 2025-06-24 RX ADMIN — TRAZODONE HYDROCHLORIDE 100 MG: 50 TABLET ORAL at 08:06

## 2025-06-24 RX ADMIN — METOPROLOL SUCCINATE 25 MG: 25 TABLET, EXTENDED RELEASE ORAL at 08:06

## 2025-06-24 RX ADMIN — LORAZEPAM 0.5 MG: 0.5 TABLET ORAL at 01:06

## 2025-06-24 RX ADMIN — BUPROPION HYDROCHLORIDE 150 MG: 150 TABLET, EXTENDED RELEASE ORAL at 08:06

## 2025-06-24 RX ADMIN — LORAZEPAM 0.5 MG: 0.5 TABLET ORAL at 08:06

## 2025-06-24 RX ADMIN — BUSPIRONE HYDROCHLORIDE 5 MG: 5 TABLET ORAL at 08:06

## 2025-06-24 NOTE — PROGRESS NOTES
Recreation Therapy Progress Note    Date: 6 24 2025    Time: 1400    Group Title: leisure skills    Mood: quiet and depressed     Behavior: participated  in group     Affect: depressed    Speech: pt talking a little more in group    Cognition: alert on cognitive game    Participation Level: 100% in group    Intervention: cont rt services.          Recreation Therapist   Signature: javon IRWINS

## 2025-06-24 NOTE — PROGRESS NOTES
DC Planning: Pt has apts scheduled at [facility] at time of DC.    [facility] medication appointment scheduled for August 6th at 1:30pm. [facility] appointment schedule for August 20th at 12:30pm. *These appointments are very hard to schedule, if you miss they will not schedule you again for 3 months. Bring ID, insurance card, medication list*

## 2025-06-24 NOTE — NURSING
"Daily Nursing Note:      Behavior:    Patient (Steph Alejandra is a 29 y.o. female, : 1995, MRN: [id number]) demonstrating an affect that was sad and anxious. Steph demonstrating mood that is depressed and anxious. Steph had an appearance that was clean. Steph denies suicidal ideation. Steph denies suicide plan. Steph denies homicidal ideation. Steph denies hallucinations.    Steph's  height is 5' 2" (1.575 m) and weight is 63 kg (138 lb 14.2 oz). Her oral temperature is 98.3 °F (36.8 °C). Her blood pressure is 106/71 and her pulse is 74. Her respiration is 18 and oxygen saturation is 99%.   Nicoles last BM was noted on: 2025.      Intervention:    Encouraged Steph to perform self-hygiene, grooming, and changing of clothing. Monitored Nicoles behavior and program compliance. Monitored Steph for suicidal ideation, homicidal ideation, sleep disturbance, and hallucinations. Encouraged Steph to eat all portions of meals and assesse for meal preferences. Monitored Steph for intake and output to ensure hydration. Will notify the Physician for any medication refusal and any change in patient condition.      Response:    Steph is more talkative with good eye contact. Preoccupied with thoughts of difficulty sleeping tonight.  Educated Steph on med changes Dr. Zurita made with her sleeping meds.  She verbalized understanding.  Complained of headache 6/10.  Tylenol given.  CIWA =7 secondary to anxiety and headache. Endorses anxiety, rating it 5/10.  Also endorses depression 3/10.        Plan:     Continue to monitor per MD orders; maintain patient safety with safety checks Q15 minutes and prn.   "

## 2025-06-24 NOTE — PROGRESS NOTES
HD # 4    29-year-old white female who today was interviewed.  Affectively she continues to be extremely constricted presentation.  Today she had not verbalize any active statements self-injurious but clearly has poor adaptive skills for coping with stress and anxiety.  Patient reports last night she had difficulties in terms of what appears to be adverse reactions to Remeron with the evidence of restless legs and impulsivity.      Patient has a result appears for the most part to have had over activation with the agents so we will go ahead and stop Remeron.    Patient describes ever present anxiety so we will consider institutional trials of BuSpar.    On mental status examination: 29-year-old white female whose affect at this time was markedly constricted.  Whose speech is with a good articulation and execution when produced.  His thought content demonstrated no clear evidence of any auditory or visual hallucinations.  Patient was making no active statements to harm self.  Patient made no active statements to harm others.  Insight and judgment deemed to be limited.      Impression:  Suicide ideations   Major depressive disorder recurrent moderate to severe without psychotic features  Alcohol use disorder    Estimated continued hospitalization: 72 hours    Indications: Patient was time presents persistent ongoing difficulties in terms of mood mood instability in his not achieved adequate stabilization with current medication trials    Recommendations:   1. We will stop trials of Remeron at this time.    2. We will increase doses of trazodone 100 mg p.o. q.h.s.   3. We will begin trials of BuSpar 5 mg p.o. t.i.d..    4. We will continue with trials of Wellbutrin  mg p.o. q.day.    5. Doses of Ativan be reduced to 0.5 mg p.o. b.i.d. as patient does appear to be showing adequate toleration for detoxification process.    We will treatment team will be held tomorrow

## 2025-06-24 NOTE — PLAN OF CARE
Problem: Depression  Goal: Improved Mood  Outcome: Progressing     Problem: Anxiety  Goal: Anxiety Reduction or Resolution  Outcome: Progressing     Problem: Seizure, Active Management  Goal: Absence of Seizure/Seizure-Related Injury  Outcome: Progressing
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
## 2025-06-24 NOTE — GROUP NOTE
Group Psychotherapy       Group Focus: Promoting Healthy Lifestyles      Number of patients in attendance: 4  Group Start Time: 1345  Group End Time:  1430  Groups Date: 6/24/2025  Group Topic:  Behavioral Health  Group Department: [facility]  Group Facilitators:  Antonette Alejandre LPN  _____________________________________________________________________    Patient Name: Steph Alejandra  MRN: [id number]  Patient Class: IP- Psych   Admission Date\Time: 6/20/2025  5:05 AM  Hospital Length of Stay: 4  Primary Care Provider: Mikala, Primary Doctor     Referred by: Behavioral Medicine Unit Treatment Team     Target symptoms: Depression, Anxiety, and Mood Disorder     Patient's response to treatment:      Progress toward goals:      Interval History: Pt refused to attend     Plan: Continue treatment on BMU

## 2025-06-24 NOTE — PLAN OF CARE
Problem: Suicide Risk  Goal: Absence of Self-Harm  Outcome: Progressing     Problem: Fall Injury Risk  Goal: Absence of Fall and Fall-Related Injury  Outcome: Progressing     Problem: Adult Behavioral Health Plan of Care  Goal: Plan of Care Review  Outcome: Progressing  Goal: Patient-Specific Goal (Individualization)  Outcome: Progressing  Goal: Adheres to Safety Considerations for Self and Others  Outcome: Progressing  Goal: Absence of New-Onset Illness or Injury  Outcome: Progressing  Goal: Optimized Coping Skills in Response to Life Stressors  Outcome: Progressing  Intervention: Promote Effective Coping Strategies  Flowsheets (Taken 6/24/2025 8299)  Supportive Measures:   active listening utilized   verbalization of feelings encouraged     Problem: Depression  Goal: Improved Mood  Outcome: Progressing     Problem: Anxiety  Goal: Anxiety Reduction or Resolution  Outcome: Progressing     Problem: Seizure, Active Management  Goal: Absence of Seizure/Seizure-Related Injury  Outcome: Progressing

## 2025-06-24 NOTE — PROGRESS NOTES
Recreation Therapy Progress Note    Date: 6 24 2025    Time: 10:00 am group    Group Title: creative expression    Mood: depressed     Behavior: participated  in art and music class    Affect: depressed but energy level a little better today     Speech: pt talking a little more.     Cognition: alert in group and focused on wood work problem solving projects  she  is making for her children.    Participation Level: 100% completed work.    Intervention:cont rt services.           Recreation Therapist   Signature: javon nick MA CTRS

## 2025-06-24 NOTE — NURSING
"Daily Nursing Note:      Behavior:    Patient (Steph Alejandra is a 29 y.o. female, : 1995, MRN: [id number]) demonstrating an affect that was sad, flat, and anxious. Steph demonstrating mood that is depressed and anxious. Steph had an appearance that was clean. Steph denies suicidal ideation. Steph denies suicide plan. Steph denies homicidal ideation. Steph denies hallucinations. Complained of issues with "restless leg" which made it difficult to fall asleep last night. She did report that after taking Vistaril at 2200 she was able to fall asleep. She rates anxiety 1/10 and depression 4/10. Denies any thoughts to self harm. Voiced that her anxiety is "ok now, but will probably be high again when I go home".    Nicoles  height is 5' 2" (1.575 m) and weight is 63 kg (138 lb 14.2 oz). Her oral temperature is 98.1 °F (36.7 °C). Her blood pressure is 116/77 and her pulse is 80. Her respiration is 18 and oxygen saturation is 100%. Nicoles last BM was noted on: 24      Intervention:    Encourage Steph to perform self-hygiene, grooming, and changing of clothing. Monitor Steph's behavior and program compliance. Monitor Steph for suicidal ideation, homicidal ideation, sleep disturbance, and hallucinations. Encourage Steph to eat all portions of meals and assess for meal preferences. Monitor Steph for intake and output to ensure hydration. Notify the Physician for any medication refusal and any change in patient condition.      Response:    Steph verbalizes understanding of unit process and procedures. She is compliant with medications, no adverse effects observed. Complained of restless leg  last night, she thinks is from  Remeron.       Plan:     Continue to monitor per MD orders; maintain patient safety.   "

## 2025-06-25 PROCEDURE — 11400000 HC PSYCH PRIVATE ROOM

## 2025-06-25 PROCEDURE — 25000003 PHARM REV CODE 250: Performed by: PSYCHIATRY & NEUROLOGY

## 2025-06-25 PROCEDURE — 25000003 PHARM REV CODE 250: Performed by: INTERNAL MEDICINE

## 2025-06-25 RX ADMIN — METOPROLOL SUCCINATE 25 MG: 25 TABLET, EXTENDED RELEASE ORAL at 08:06

## 2025-06-25 RX ADMIN — BUSPIRONE HYDROCHLORIDE 5 MG: 5 TABLET ORAL at 08:06

## 2025-06-25 RX ADMIN — BUPROPION HYDROCHLORIDE 150 MG: 150 TABLET, EXTENDED RELEASE ORAL at 08:06

## 2025-06-25 RX ADMIN — LORAZEPAM 0.5 MG: 0.5 TABLET ORAL at 08:06

## 2025-06-25 RX ADMIN — BUSPIRONE HYDROCHLORIDE 5 MG: 5 TABLET ORAL at 02:06

## 2025-06-25 RX ADMIN — TRAZODONE HYDROCHLORIDE 100 MG: 50 TABLET ORAL at 08:06
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
## 2025-06-25 NOTE — PLAN OF CARE
Problem: Suicide Risk  Goal: Absence of Self-Harm  Outcome: Met  Intervention: Assess Risk to Self and Maintain Safety  Flowsheets (Taken 6/24/2025 2137)  Behavior Management:   boundaries reinforced   impulse control promoted  Intervention: Promote Psychosocial Wellbeing  Flowsheets (Taken 6/24/2025 2137)  Supportive Measures:   active listening utilized   positive reinforcement provided   verbalization of feelings encouraged     Problem: Fall Injury Risk  Goal: Absence of Fall and Fall-Related Injury  Outcome: Met  Intervention: Identify and Manage Contributors  Flowsheets (Taken 6/24/2025 2025)  Medication Review/Management: medications reviewed     Problem: Seizure, Active Management  Goal: Absence of Seizure/Seizure-Related Injury  Intervention: Prevent Seizure-Related Injury  Flowsheets (Taken 6/24/2025 2025)  Seizure Precautions: activity supervised

## 2025-06-25 NOTE — PLAN OF CARE
Problem: Suicide Risk  Goal: Absence of Self-Harm  Outcome: Met  Intervention: Assess Risk to Self and Maintain Safety  Flowsheets (Taken 6/24/2025 2137)  Behavior Management:   boundaries reinforced   impulse control promoted  Intervention: Promote Psychosocial Wellbeing  Flowsheets (Taken 6/24/2025 2137)  Supportive Measures:   active listening utilized   positive reinforcement provided   verbalization of feelings encouraged

## 2025-06-25 NOTE — NURSING
"Daily Nursing Note:      Behavior:    Patient (Steph Alejandra is a 29 y.o. female, : 1995, MRN: [id number]) demonstrating an affect that was sad and anxious. Steph demonstrating mood that is depressed and anxious. Steph had an appearance that was clean. Steph denies suicidal ideation. Steph denies suicide plan. Steph denies homicidal ideation. Steph denies hallucinations.    Steph's  height is 5' 2" (1.575 m) and weight is 63 kg (138 lb 14.2 oz). Her oral temperature is 98.7 °F (37.1 °C). Her blood pressure is 106/69 and her pulse is 75. Her respiration is 18 and oxygen saturation is 100%.   Nicoles last BM was noted on: 2025.      Intervention:    Encouraged Steph to perform self-hygiene, grooming, and changing of clothing. Monitored Steph's behavior and program compliance. Monitored Steph for suicidal ideation, homicidal ideation, sleep disturbance, and hallucinations. Encouraged Steph to eat all portions of meals and assessed for meal preferences. Monitored Steph for intake and output to ensure hydration. Will notify the Physician for any medication refusal and any change in patient condition.      Response:    Steph CIWA+2.  She endorses anxiety and depression.  She rates her anxiety 2/10 in intensity and depression 3/10 in intensity.  Denies any suicidal or homicidal ideations.  Good eye contact during assessment.  Verbalizes "feeling better" this evening.        Plan:     Continue to monitor per MD orders; maintain patient safety with safety checks Q15 minutes and prn.   "

## 2025-06-25 NOTE — NURSING
"Daily Nursing Note:      Behavior:    Patient (Steph Alejandra is a 29 y.o. female, : 1995, MRN: [id number]) demonstrating an affect that was anxious. Steph demonstrating mood that is anxious. Steph had an appearance that was clean. Steph denies suicidal ideation. Steph denies suicide plan. Steph denies homicidal ideation. Steph denies hallucinations.    Steph's  height is 5' 2" (1.575 m) and weight is 63 kg (138 lb 14.2 oz). Her oral temperature is 97.8 °F (36.6 °C). Her blood pressure is 93/67 and her pulse is 72. Her respiration is 16 and oxygen saturation is 99%.     Nicoles last BM was noted on: 2025.  Steph has been coming out more and participating in unit activities. She is interacting more with staff and peers. She has plans to go home after discharge.      Intervention:    Encourage Steph to perform self-hygiene, grooming, and changing of clothing. Monitor Steph's behavior and program compliance. Monitor Steph for suicidal ideation, homicidal ideation, sleep disturbance, and hallucinations. Encourage Steph to eat all portions of meals and assess for meal preferences.  Ensure hydration by offering fluids. Notify the Physician for any medication refusal and any change in patient condition.      Response:    Steph verbalizes understand of unit process and procedures. Steph is compliant with medications and treatment.    Plan:     Continue to monitor per MD orders; maintain patient safety. Q15min safety checks. Fall, seizure and suicide precautions.  "

## 2025-06-25 NOTE — PLAN OF CARE
Problem: Anxiety  Goal: Anxiety Reduction or Resolution  Outcome: Ongoing     Problem: Adult Behavioral Health Plan of Care  Goal: Plan of Care Review  Outcome: Ongoing  Goal: Patient-Specific Goal (Individualization)  Outcome: Ongoing  Goal: Adheres to Safety Considerations for Self and Others  Outcome: Ongoing  Goal: Absence of New-Onset Illness or Injury  Outcome: Ongoing  Goal: Optimized Coping Skills in Response to Life Stressors  Outcome: Ongoing  Goal: Develops/Participates in Therapeutic [place] to Support Successful Transition  Outcome: Ongoing  Goal: Rounds/Family Conference  Outcome: Ongoing     Problem: Seizure, Active Management  Goal: Absence of Seizure/Seizure-Related Injury  Outcome: Ongoing     Problem: Depression  Goal: Improved Mood  Outcome: Met

## 2025-06-25 NOTE — TREATMENT PLAN
Treatment Recommendation: To address problem(s) #    Pt reports to decrease in depressive symptoms (continued ). Pt reports to decrease in anxiety symptoms (continued). Pt reports to an increase in sleep hours (met). Pt reports to medication compliance (met). Pt reports to less feelings of hopefulness (met). Pt to establish 3 coping skills prior to DC (continued). Pt reports to decrease in paranoia (met). Reports to decrease in SI, HI, VH and AH (met).     Initial TT with Dr. Zurita, Ritika Guaman RN, and RT Derick.     1:1 Intervention (as needed)    Stress Management Skilled Activity  Coping Skilled Activity    Treatment Goal(s):  Long Term Goals Refer To Master Treatment Plan    Short Term Treatment Goal(s)  Patient Will:  Comply with Treatment  Exhibit Improvement in Mood  Demonstrate Improvement in Thought Processes / Awareness  Integrate with Therapeutic Milieu  Demonstrate Constructive Expression of Feelings and Behavior  Verbalize Improvement in Mood  Identify at Least 2 Coping Skills or Leisure Skills to Reduce Depression and Hopelessness Upon Request from Therapist  Identify (+) Leisure / Recreation Activities that Promote Wellness and Promote a Sober Lifestyle    Discharge Recommendations:  Encourage Patient to Actively Utilize Available Community Resources to Increase Leisure Involvement to Decrease Signs and Symptoms of Illness  Encourage Patient to Utilize Coping Skills on a Regular Basis to Reduce the Risk of Decompensating and Re-Hospitalizations

## 2025-06-25 NOTE — PROGRESS NOTES
Recreation Therapy Progress Note    Date: 6 25 2025    Time: 10:00 am group    Group Title: creative expression    Mood: pt  quiet  and low energy     Behavior: pt  participated ....in art and music at 100%     Affect: pt quiet  and low energy .    Speech: pt talking at intervals     Cognition: pt focused on assignment    Participation Level: 100%     Intervention:cont rt services          Recreation Therapist   Signature: Sandy nick MA CTRS

## 2025-06-25 NOTE — GROUP NOTE
Group Psychotherapy       Group Focus: Stress Management    Group discussed anxiety. Group identified what anxiety feels like to them. Group discussed what triggers their anxiety. SBFT utilized.      Number of patients in attendance: 4  Group Start Time: 0900  Group End Time:  0945  Groups Date: 6/25/2025  Group Topic:  Behavioral Health  Group Department: [facility] - Behavioral [facility] Unit  Group Facilitators:  Sue You LCSW  _____________________________________________________________________    Patient Name: Steph Alejandra  MRN: [id number]  Patient Class: IP- Psych   Admission Date\Time: 6/20/2025  5:05 AM  Hospital Length of Stay: 5  Primary Care Provider: No, Primary Doctor     Referred by: Acute Psychiatry Unit Treatment Team     Target symptoms: Alcohol Abuse, Substance Abuse, Depression, Anxiety, and Mood Disorder     Patient's response to treatment: Not Participating     Progress toward goals: Not progressing       Plan: Continue treatment on APU    Pt refused to come to group. Stayed in bed.

## 2025-06-25 NOTE — PROGRESS NOTES
Recreation Therapy Progress Note    Date: 6 25 2025    Time: 1400    Group Title: leisure skills    Mood: depressed a little less.  but more interests in group    Behavior: participated in leisure skills    Affect: less depressed     Speech: pt  talking a little more in group    Cognition: focused on assignment in problem solving    Participation Level: 100% completed work    Intervention:cont rt services.          Recreation Therapist   Signature: javon nick MA CTRS

## 2025-06-26 PROCEDURE — 11400000 HC PSYCH PRIVATE ROOM

## 2025-06-26 PROCEDURE — 25000003 PHARM REV CODE 250: Performed by: INTERNAL MEDICINE

## 2025-06-26 PROCEDURE — 25000003 PHARM REV CODE 250: Performed by: PSYCHIATRY & NEUROLOGY

## 2025-06-26 RX ORDER — OXCARBAZEPINE 150 MG/1
150 TABLET, FILM COATED ORAL 2 TIMES DAILY
Status: DISCONTINUED | OUTPATIENT
Start: 2025-06-26 | End: 2025-06-27

## 2025-06-26 RX ORDER — BUSPIRONE HYDROCHLORIDE 10 MG/1
10 TABLET ORAL 3 TIMES DAILY
Status: DISCONTINUED | OUTPATIENT
Start: 2025-06-26 | End: 2025-06-28 | Stop reason: HOSPADM

## 2025-06-26 RX ORDER — TRAZODONE HYDROCHLORIDE 150 MG/1
150 TABLET ORAL NIGHTLY
Status: DISCONTINUED | OUTPATIENT
Start: 2025-06-26 | End: 2025-06-28 | Stop reason: HOSPADM

## 2025-06-26 RX ADMIN — BUSPIRONE HYDROCHLORIDE 5 MG: 5 TABLET ORAL at 02:06

## 2025-06-26 RX ADMIN — LORAZEPAM 0.5 MG: 0.5 TABLET ORAL at 08:06

## 2025-06-26 RX ADMIN — ACETAMINOPHEN 650 MG: 325 TABLET ORAL at 04:06

## 2025-06-26 RX ADMIN — BUSPIRONE HYDROCHLORIDE 5 MG: 5 TABLET ORAL at 08:06

## 2025-06-26 RX ADMIN — BUSPIRONE HYDROCHLORIDE 10 MG: 10 TABLET ORAL at 08:06

## 2025-06-26 RX ADMIN — TRAZODONE HYDROCHLORIDE 150 MG: 150 TABLET ORAL at 08:06

## 2025-06-26 RX ADMIN — BUPROPION HYDROCHLORIDE 150 MG: 150 TABLET, EXTENDED RELEASE ORAL at 08:06

## 2025-06-26 RX ADMIN — METOPROLOL SUCCINATE 25 MG: 25 TABLET, EXTENDED RELEASE ORAL at 08:06

## 2025-06-26 RX ADMIN — OXCARBAZEPINE 150 MG: 150 TABLET, FILM COATED ORAL at 08:06

## 2025-06-26 NOTE — PROGRESS NOTES
HD # 6    29-year-old white female who at this time is interviewed today.  She continues to complain of difficulties in terms of mood instability, irritability staff do confirm that Maritza's has been more attest to be more irritable in the unit Maritza's feels as if her anxiety as well as mood that has not been fully stabilize she does appear for the most part to be little bit more downcast today in his able to acknowledge that has Maritza's struggling in terms of stabilization in his mood.    Patient did not verbalize any active plan to self-harm at this time.    On mental status examination: 29-year-old white female who at this time continues to have difficulties in terms of irritability low frustration tolerance.  Whose speech at this time was with a good articulation and execution.  Whose thought processes this time were non spontaneous.  Whose thought content demonstrated no clear evidence of any auditory or visual hallucinations.  Patient has been directly questioned mother's Maritza's any thoughts to self-injurious she minimized this time.  She made no statements intentions to harm others.  Her insight and judgment this time were deemed to be limited.    Impression:  Suicide ideations improved   Major depressive disorder recurrent severe without psychotic features   Alcohol use disorder     Indications: Patient was time continues to present with the ongoing difficulties in terms of mood instability lab stabilization mood and full response to medications     Estimated continued hospitalization 48-72 hours    Indications:  1. Wellbutrin  mg p.o. q.day we will be continued   Two continue trials of BuSpar but we will advance those doses to 10 mg p.o. t.i.d..    3.  We will begin patient on trials of Trileptal for augmentation in terms of mood instability we will begun Trileptal 150 mg p.o. b.i.d..  4. Doses of Toprol be cut in half as patient has been complaining of some dizziness and lightheadedness.    5.  Attempts to stabilize asleep we will go ahead and look to increase doses of trazodone to 150 mg p.o. q.h.s..  6. We will continue to work with the patient but at this time do not feel confident Maritza's achieved enough stabilization where Maritza's safely be discharged to outpatient service level imminent risk to self injurious appears to be lessened but in his unstructured environment certainly could be at greater potential

## 2025-06-26 NOTE — NURSING
"Daily Nursing Note:      Behavior:    Patient (Steph Alejandra is a 29 y.o. female, : 1995, MRN: [id number]) demonstrating an affect that was restricted .... Steph demonstrating mood that is depressed and anxious. Steph had an appearance that was clean. Steph denies suicidal ideation. Steph denies suicide plan. Steph denies homicidal ideation. Steph denies hallucinations.    Steph's  height is 5' 2" (1.575 m) and weight is 63 kg (138 lb 14.2 oz). Her oral temperature is 97.6 °F (36.4 °C). Her blood pressure is 98/63 and her pulse is 70. Her respiration is 20 and oxygen saturation is 100%.     Nicoles last BM was noted on: 2025. Steph is present on the unit. She interacts with some peers. Other peers do not engage in conversation and avoid her.      Intervention:    Encourage Steph to perform self-hygiene, grooming, and changing of clothing. Monitor Steph's behavior and program compliance. Monitor Steph for suicidal ideation, homicidal ideation, sleep disturbance, and hallucinations. Encourage Steph to eat all portions of meals and assess for meal preferences.  Ensure hydration by offering fluids. Notify the Physician for any medication refusal and any change in patient condition.      Response:    Steph verbalizes understand of unit process and procedures. Steph is compliant with medications and treatment.    Plan:     Continue to monitor per MD orders; maintain patient safety. Q15min safety checks.  "

## 2025-06-26 NOTE — NURSING
"PRN Administration Note:    Behavior:    Patient (Steph Alejandra is a 29 y.o. female, : 1995, MRN: [id number])     Allergies: Patient has no known allergies.    Steph's  height is 5' 2" (1.575 m) and weight is 63 kg (138 lb 14.2 oz). Her oral temperature is 97.6 °F (36.4 °C). Her blood pressure is 98/63 and her pulse is 70. Her respiration is 20 and oxygen saturation is 100%.     Reason for PRN Administration: c/o voiced of h/a scale 7/10.    Intervention:    Administered tylenol 650 mg given per physician order to Steph       Response:    Steph tolerated administration well.      Plan:     Continue to monitor per MD/PA/APRN orders; and reevaluate medication effectiveness within 30 minutes.  "

## 2025-06-26 NOTE — PROGRESS NOTES
Recreation Therapy Progress Note    Date: 6 26 2025    Time: 1400    Group Title: leisure skills    Mood: pt mood brighter this afternoon    Behavior: pt  participated in leisure skills    Affect: pt   affect brighter    Speech: pt  talking more this afternoon    Cognition: focused on cognitive game    Participation Level: 100%     Intervention:cont   rt services.          Recreation Therapist   Signature: javon IRWINS

## 2025-06-26 NOTE — PROGRESS NOTES
Recreation Therapy Progress Note    Date: 6 26 2025    Time: 10:00 am group    Group Title: creative expression    Mood: irritable and guarded today . Pt is focused on DC    Behavior: pt attended group but left group angry and irritable . Pt did write in her journal .pt social skills are poor.    Affect: pt  irritable and depressed.     Speech: quiet    Cognition: alert  but distracted with ready to go home.     Participation Level: pt participated in group for 15 minutes . Pt got up and walked out very irritable.     Intervention:cont rt services.           Recreation Therapist   Signature: javon nick MA CTRS

## 2025-06-26 NOTE — NURSING
2200. AAO and anxious. Displayed no C/O. Admitted to Depression=2 and Anxiety=3. Denied any SI/HI/AH/VH. Accepted all PM meds as prescribed by Provider. In room resting quietly.

## 2025-06-26 NOTE — PLAN OF CARE
Problem: Anxiety  Goal: Anxiety Reduction or Resolution  Outcome: Progressing     Problem: Adult Behavioral Health Plan of Care  Goal: Plan of Care Review  Outcome: Progressing  Goal: Patient-Specific Goal (Individualization)  Outcome: Progressing  Goal: Adheres to Safety Considerations for Self and Others  Outcome: Progressing  Goal: Absence of New-Onset Illness or Injury  Outcome: Progressing  Goal: Optimized Coping Skills in Response to Life Stressors  Outcome: Progressing  Goal: Develops/Participates in Therapeutic [place] to Support Successful Transition  Outcome: Progressing  Goal: Rounds/Family Conference  Outcome: Progressing     Problem: Seizure, Active Management  Goal: Absence of Seizure/Seizure-Related Injury  Outcome: Progressing

## 2025-06-26 NOTE — NURSING
"PRN Medication Follow-up Note:    Behavior:    Patient (Steph Alejandra is a 29 y.o. female, : 1995, MRN: [id number])     Allergies: Patient has no known allergies.    Steph's  height is 5' 2" (1.575 m) and weight is 63 kg (138 lb 14.2 oz). Her oral temperature is 98 °F (36.7 °C). Her blood pressure is 103/64 and her pulse is 78. Her respiration is 16 and oxygen saturation is 99%.     Administered tylenol 650 mg  per physician order to Steph       Intervention:    Intervention to Steph's response: no further c/o voiced .       Response:    Steph's response: resting quietly      Plan:     Continue to monitor per MD/PA/APRN orders; and reevaluate medication effectiveness within 30 minutes.  "
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
## 2025-06-27 PROCEDURE — 11400000 HC PSYCH PRIVATE ROOM

## 2025-06-27 PROCEDURE — 25000003 PHARM REV CODE 250: Performed by: PSYCHIATRY & NEUROLOGY

## 2025-06-27 RX ORDER — TRAZODONE HYDROCHLORIDE 150 MG/1
150 TABLET ORAL NIGHTLY
Qty: 30 TABLET | Refills: 1 | Status: SHIPPED | OUTPATIENT
Start: 2025-06-27 | End: 2025-08-26

## 2025-06-27 RX ORDER — BUPROPION HYDROCHLORIDE 150 MG/1
150 TABLET ORAL DAILY
Qty: 30 TABLET | Refills: 1 | Status: SHIPPED | OUTPATIENT
Start: 2025-06-28 | End: 2025-08-27

## 2025-06-27 RX ORDER — OXCARBAZEPINE 300 MG/1
300 TABLET, FILM COATED ORAL 2 TIMES DAILY
Status: DISCONTINUED | OUTPATIENT
Start: 2025-06-27 | End: 2025-06-28 | Stop reason: HOSPADM

## 2025-06-27 RX ORDER — BUSPIRONE HYDROCHLORIDE 10 MG/1
10 TABLET ORAL 3 TIMES DAILY
Qty: 90 TABLET | Refills: 1 | Status: SHIPPED | OUTPATIENT
Start: 2025-06-27 | End: 2025-08-26

## 2025-06-27 RX ORDER — OXCARBAZEPINE 300 MG/1
300 TABLET, FILM COATED ORAL 2 TIMES DAILY
Qty: 60 TABLET | Refills: 1 | Status: SHIPPED | OUTPATIENT
Start: 2025-06-27 | End: 2025-08-26

## 2025-06-27 RX ADMIN — BUSPIRONE HYDROCHLORIDE 10 MG: 10 TABLET ORAL at 08:06

## 2025-06-27 RX ADMIN — OXCARBAZEPINE 150 MG: 150 TABLET, FILM COATED ORAL at 08:06

## 2025-06-27 RX ADMIN — TRAZODONE HYDROCHLORIDE 150 MG: 150 TABLET ORAL at 08:06

## 2025-06-27 RX ADMIN — ALUMINUM HYDROXIDE, MAGNESIUM HYDROXIDE, AND DIMETHICONE 30 ML: 200; 20; 200 SUSPENSION ORAL at 10:06

## 2025-06-27 RX ADMIN — OXCARBAZEPINE 300 MG: 300 TABLET, FILM COATED ORAL at 08:06

## 2025-06-27 RX ADMIN — BUSPIRONE HYDROCHLORIDE 10 MG: 10 TABLET ORAL at 03:06

## 2025-06-27 RX ADMIN — BUPROPION HYDROCHLORIDE 150 MG: 150 TABLET, EXTENDED RELEASE ORAL at 08:06

## 2025-06-27 NOTE — NURSING
"Daily Nursing Note:      Behavior:    Patient (Steph Alejandra is a 29 y.o. female, : 1995, MRN: [id number]) demonstrating an affect that was flat and anxious. Steph demonstrating mood that is depressed and anxious. Steph had an appearance that was clean. Steph denies suicidal ideation. Steph denies suicide plan. Steph denies homicidal ideation. Steph denies hallucinations.She rates anxiety and depression both 1/10 with no thoughts to self harm. Complained of trouble falling asleep last night. Staff reports 7.5 hours. More visible and interactive on the unit.    Steph's  height is 5' 2" (1.575 m) and weight is 63 kg (138 lb 14.2 oz). Her oral temperature is 97.9 °F (36.6 °C). Her blood pressure is 98/62 and her pulse is 69. Her respiration is 18 and oxygen saturation is 97%. Nicoles last BM was noted on: 25    Intervention:    Encourage Steph to perform self-hygiene, grooming, and changing of clothing. Monitor Steph's behavior and program compliance. Monitor Steph for suicidal ideation, homicidal ideation, sleep disturbance, and hallucinations. Encourage Steph to eat all portions of meals and assess for meal preferences. Monitor Steph for intake and output to ensure hydration. Notify the Physician for any medication refusal and any change in patient condition.      Response:    Steph verbalizes understanding of unit process and procedures. Stephhas been compliant with medications, no adverse effects observed. Plans are to discharge home in the [facility]      Plan:     Continue to monitor per MD orders; maintain patient safety. Q 15 min safety checks  "

## 2025-06-27 NOTE — PROGRESS NOTES
Inpatient Nutrition Assessment    Admit Date: 6/20/2025   Total duration of encounter: 7 days   Patient Age: 29 y.o.    Nutrition Recommendation/Prescription     Diet Adult Regular Double Portions; Isolation Tray - Styrofoam  RD to monitor wt, labs, and po intake.    Communication of Recommendations: EMR    Nutrition Assessment     Malnutrition Assessment/Nutrition-Focused Physical Exam    Malnutrition Context: acute illness or injury (06/27/25 0946)  Malnutrition Level: other (see comments) (Does not meet criteria) (06/27/25 0946)  Energy Intake (Malnutrition): other (see comments) (Does not meet criteria) (06/27/25 0946)  Weight Loss (Malnutrition): other (see comments) (Unable to assess) (06/27/25 0946)                                         Fluid Accumulation (Malnutrition): other (see comments) (Not present) (06/27/25 0946)     Hand  Strength, Right (Malnutrition): Unable to assess (06/27/25 0946)  A minimum of two characteristics is recommended for diagnosis of either severe or non-severe malnutrition.    Chart Review    Reason Seen: length of stay    Malnutrition Screening Tool Results   Have you recently lost weight without trying?: No  Have you been eating poorly because of a decreased appetite?: No   MST Score: 0   Diagnosis:  Major depression, recurrent     Relevant Medical History:   Bipolar disorder, unspecified        Scheduled Medications:  buPROPion, 150 mg, Daily  busPIRone, 10 mg, TID  metoprolol succinate, 12.5 mg, Daily  OXcarbazepine, 150 mg, BID  traZODone, 150 mg, QHS    Continuous Infusions:   PRN Medications:  acetaminophen, 650 mg, Q6H PRN  aluminum-magnesium hydroxide-simethicone, 30 mL, Q6H PRN  hydrOXYzine pamoate, 50 mg, Q6H PRN  ibuprofen, 400 mg, Q6H PRN  OLANZapine zydis, 10 mg, Q8H PRN    Calorie Containing IV Medications: no significant kcals from medications at this time    Recent Labs   Lab 06/21/25  0445   TRIG 57   HGBA1C 4.6     Nutrition Orders:  Diet Adult Regular  "Double Portions; Isolation Tray - Styrofoam      Appetite/Oral Intake: good/% of meals  Factors Affecting Nutritional Intake: none identified  Social Needs Impacting Access to Food: none identified  Food/[place]/Cultural Preferences: unable to obtain  Food Allergies: no known food allergies  Last Bowel Movement: 06/26/25  Wound(s):  none    Comments    6/27/25: Pt with good appetite and intake, consuming 100% of meals and tolerating current diet. Denies n/v/d/c. Wt hx limited, no significant wt loss noted. Labs and meds reviewed.    Anthropometrics    Height: 5' 2" (157.5 cm), Height Method: Measured  Last Weight: 63 kg (138 lb 14.2 oz) (06/22/25 0612), Weight Method: Standard Scale  BMI (Calculated): 25.4  BMI Classification: overweight (BMI 25-29.9)     Ideal Body Weight (IBW), Female: 110 lb     % Ideal Body Weight, Female (lb): 126.06 %                             Usual Weight Provided By: unable to obtain usual weight    Wt Readings from Last 5 Encounters:   06/22/25 63 kg (138 lb 14.2 oz)   06/20/25 64.9 kg (143 lb)   01/13/21 63.9 kg (140 lb 14 oz)     Weight Change(s) Since Admission:   6/27: 63 kg  Wt Readings from Last 1 Encounters:   06/22/25 0612 63 kg (138 lb 14.2 oz)   06/20/25 0515 62.9 kg (138 lb 10.7 oz)   Admit Weight: 62.9 kg (138 lb 10.7 oz) (06/20/25 0515), Weight Method: Standard Scale    Estimated Needs    Weight Used For Calorie Calculations: 63 kg (138 lb 14.2 oz)  Energy Calorie Requirements (kcal): 3473-6824 kcal (30-35 kcal/kg)  Energy Need Method: Kcal/kg  Weight Used For Protein Calculations: 63 kg (138 lb 14.2 oz)  Protein Requirements: 63-69 g (1.0-1.1 g/kg)  Fluid Requirements (mL): 1890 mL/d (1 mL/kcal)        Enteral Nutrition     Patient not receiving enteral nutrition at this time.    Parenteral Nutrition     Patient not receiving parenteral nutrition support at this time.    Evaluation of Received Nutrient Intake    Calories: meeting estimated needs  Protein: meeting " estimated needs    Patient Education     Not applicable.    Nutrition Diagnosis     PES: No nutrition diagnosis at this time     Nutrition Interventions     Intervention(s): general/healthful diet and collaboration with other providers    Goal: Maintain weight throughout hospitalization. (new)    Nutrition Goals & Monitoring     Dietitian will monitor: food and beverage intake and weight  Discharge planning: resume home regimen  Nutrition Risk/Follow-Up: dietitian will follow-up one time per week   Please consult if re-assessment needed sooner.

## 2025-06-27 NOTE — PLAN OF CARE
Problem: Adult Behavioral Health Plan of Care  Goal: Plan of Care Review  Outcome: Adequate for Care Transition  Goal: Patient-Specific Goal (Individualization)  Outcome: Adequate for Care Transition  Goal: Adheres to Safety Considerations for Self and Others  Outcome: Adequate for Care Transition  Goal: Absence of New-Onset Illness or Injury  Outcome: Adequate for Care Transition  Goal: Optimized Coping Skills in Response to Life Stressors  Outcome: Adequate for Care Transition     Problem: Anxiety  Goal: Anxiety Reduction or Resolution  Outcome: Adequate for Care Transition     Problem: Seizure, Active Management  Goal: Absence of Seizure/Seizure-Related Injury  Outcome: Adequate for Care Transition

## 2025-06-27 NOTE — NURSING
"Metoprolol held due to SBP <100 and Pt concerned about "feeling dizzy like yesterday." MD aware.  "

## 2025-06-27 NOTE — PLAN OF CARE
Problem: Anxiety  Goal: Anxiety Reduction or Resolution  Outcome: Progressing     Problem: Adult Behavioral Health Plan of Care  Goal: Plan of Care Review  Outcome: Progressing  Goal: Patient-Specific Goal (Individualization)  Outcome: Progressing  Goal: Adheres to Safety Considerations for Self and Others  Outcome: Progressing  Goal: Absence of New-Onset Illness or Injury  Outcome: Progressing  Goal: Optimized Coping Skills in Response to Life Stressors  Outcome: Progressing  Goal: Develops/Participates in Therapeutic [place] to Support Successful Transition  Outcome: Progressing  Goal: Rounds/Family Conference  Outcome: Progressing     Problem: Seizure, Active Management  Goal: Absence of Seizure/Seizure-Related Injury  Outcome: Progressing

## 2025-06-27 NOTE — PROGRESS NOTES
Recreation Therapy Progress Note    Date: 6 27 2025    Time: 1400    Group Title: leisure skills    Mood: pt has brighter mood    Behavior: pt participating in outdoor sports  . Pt has better social skills    Affect: pt has brighter affect    Speech: pt talking more in group    Cognition: alert on activity    Participation Level: 100% in outdoor exercise    Intervention:cont rt services.          Recreation Therapist   Signature: javon nick MA CTRS

## 2025-06-27 NOTE — GROUP NOTE
"Group Psychotherapy       Group Focus: Spirituality and Well-Being    Group discussed Gratitude Exercises. Group identified things they are thankful for today. Group identified things they have overcome while inpatient. Group identified and discussed their emotions. CBT utilized.      Number of patients in attendance: 5  Group Start Time: 0900  Group End Time:  0945  Groups Date: 6/27/2025  Group Topic:  Behavioral Health  Group Department: [facility]  Group Facilitators:  Sue You LCSW  _____________________________________________________________________    Patient Name: Steph Alejandra  MRN: [id number]  Patient Class: IP- Psych   Admission Date\Time: 6/20/2025  5:05 AM  Hospital Length of Stay: 7  Primary Care Provider: Mikala, Primary Doctor     Referred by: Acute Psychiatry Unit Treatment Team     Target symptoms: Alcohol Abuse, Substance Abuse, Depression, Anxiety, and Mood Disorder     Patient's response to treatment: Active Listening and Self-disclosure     Progress toward goals: Progressing slowly       Plan: Continue treatment on APU    Pt does not appear to be agitated or irritable today. Pt was agreeable to group attendance. Pt verbalizes continued issues with sleeping at facility. Pt discussed her children this group, has not discussed them before. Pt reports that she has overcome her "suicidal thoughts". Pt engaged with other group members and had appropriate eye contact. Pt at times smiled in group which has not happened prior. Pt continues to present as guarded during group and shares little responses. Pt continues to present as depressed, anxious and flat. Pt is restrictive with her answers.     "

## 2025-06-27 NOTE — NURSING
2200. AAO and depressed. Displayed no C/O. Admitted to Depression=1 and Anxiety=2. Denied any SI/HI/AH/VH. Accepted all PM meds as prescribed by Provider. In room resting quietly.

## 2025-06-27 NOTE — PROGRESS NOTES
Recreation Therapy Progress Note    Date:   6 27 2025    Time: 10:00 am group    Group Title: creative expression    Mood: pt less depressed    Behavior: pt participated more     Affect: pt less depressed    Speech: pt talking more in group    Cognition: pt  more focused in group    Participation Level: pt completed 100% on problem solving activity.    Intervention: cont  rt services          Recreation Therapist   Signature: javon IRWINS

## 2025-06-27 NOTE — PROGRESS NOTES
HD # 7    29-year-old white female who today was interviewed.  She appears for the most part to be showing evidence of enough improvement I would suspect she would go to lesser level of care.  Her quality asleep was not all that great last night but appears to be adequate.    When questioned today that has quite clear that she does not pose an imminent risk to self-harm.  She clearly states She not homicidal or suicidal.  There is in his states Maritza's no active plan to self-harm.    Her quality asleep still was not all that great last night.  She reports no  lightheadedness dizziness has been reported  reports her Toprol had to be held this morning.  She does report house previously Toprol was agents that she had not taken with a consistent basis.    Patient this time otherwise describes her mood has been improved.  I do suspect She is reaching level of care where she could successfully go to a lower service level.    Impression:  Suicide ideation resolving   Major depressive disorder recurrent severe without psychotic features   Alcohol use disorder     Estimated continued hospitalization:  24 hours     Indications: Patient was time presents ongoing difficulties in terms of mood instability and impaired functioning such at this time can not safely be maintained at a lower level of care    Recommendations:   1. Continue trials of trazodone 150 mg p.o. q.hs  2. Increase doses of BuSpar has been made to 10 mg p.o. t.i.d..  We will continue   3. Have increase doses Trileptal 300 mg p.o. b.i.d..    4.  All Ativan has been discontinued   5. Patient in his Maritza's no longer suicidal do feel strongly that she could go forward to a lower level of care at this time.

## 2025-06-28 VITALS
HEIGHT: 62 IN | DIASTOLIC BLOOD PRESSURE: 61 MMHG | RESPIRATION RATE: 16 BRPM | OXYGEN SATURATION: 97 % | BODY MASS INDEX: 25.55 KG/M2 | TEMPERATURE: 98 F | SYSTOLIC BLOOD PRESSURE: 97 MMHG | WEIGHT: 138.88 LBS | HEART RATE: 74 BPM

## 2025-06-28 PROCEDURE — 25000003 PHARM REV CODE 250: Performed by: PSYCHIATRY & NEUROLOGY

## 2025-06-28 RX ADMIN — BUSPIRONE HYDROCHLORIDE 10 MG: 10 TABLET ORAL at 08:06

## 2025-06-28 RX ADMIN — OXCARBAZEPINE 300 MG: 300 TABLET, FILM COATED ORAL at 08:06

## 2025-06-28 RX ADMIN — BUPROPION HYDROCHLORIDE 150 MG: 150 TABLET, EXTENDED RELEASE ORAL at 08:06

## 2025-06-28 NOTE — NURSING
"Daily Nursing Note:      Behavior:    Patient (Steph Alejandra is a 29 y.o. female, : 1995, MRN: [id number]) demonstrating an affect that was anxious. Steph demonstrating mood that is anxious. Steph had an appearance that was clean and good hygiene. Steph denies suicidal ideation. Steph denies suicide plan. Steph denies homicidal ideation. Steph denies hallucinations.    Steph's  height is 5' 2" (1.575 m) and weight is 63 kg (138 lb 14.2 oz). Her oral temperature is 98.1 °F (36.7 °C). Her blood pressure is 98/66 and her pulse is 82. Her respiration is 18 and oxygen saturation is 100%.   Nicoles last BM was noted on: 2025.      Intervention:    Encouraged Steph to perform self-hygiene, grooming, and changing of clothing. Monitored Steph's behavior and program compliance. Monitored Steph for suicidal ideation, homicidal ideation, sleep disturbance, and hallucinations. Encouraged Steph to eat all portions of meals and assessed for meal preferences. Monitored Steph for intake and output to ensure hydration. Will notify the Physician for any medication refusal and any change in patient condition.      Response:    Steph denies any thoughts of wanting to harm herself.  Sitting in dining room talking with peer and watching television.  Good eye contact during assessment.  Endorses anxiety 3/10.  Denies any depression at this time.  States she "feels 90% better" and feels like she "can handle the pressures of life a little better" moving forward.        Plan:     Continue to monitor per MD orders; maintain patient safety with safety checks Q15 minutes and prn.   "

## 2025-06-28 NOTE — ASSESSMENT & PLAN NOTE
Patient has tolerated overall detoxification process with the use of the benzodiazepines in his had no difficulties.  Patient appears that this time to be educated in regards to her need to abstain in the appears motivated to do so.

## 2025-06-28 NOTE — ASSESSMENT & PLAN NOTE
Overall mood is stabilized with the use of antidepressants whose actions with the augmentation with trials of Trileptal and BuSpar.  Patient upon discharge in his no longer suicidal.  Maritza's motivated to participate in outpatient service level.

## 2025-06-28 NOTE — HOSPITAL COURSE
Date of discharge: 06/28/2025.      29-year-old white female with a history of previous treatment who at this time presents to the emergency room here at [facility] ongoing statements intentions to self-injurious marked mood instability.  Patient was admitted to the inpatient setting so she underwent full medical, psychosocial, psychiatric evaluation with the 1st day has a hospitalization.  Patient was seen in full physical evaluation.  On physical assessment patient was found to have no acute changes in current physical findings.    Patient underwent full psychiatric assessment this time.  On psychiatric assessment patient shows clear evidence of marked mood instability, threats to self injurious abuse of psychoactive substance particularly alcohol and difficulties in terms of mood instability.  Patient was recommended at this time to be instituted on trials of benzodiazepines to help mitigate difficulty in terms of withdrawal from alcohol.  Patient had no complications in terms of overall withdrawal pattern.      Patient comply with overall management.  To address issues of mood instability as well as sleep difficulties patient was initiated on trials of Minipress initially however patient did not tolerate well and complained of some lightheaded dizziness periods patient was subsequently transitioned over to trazodone those doses were titrated upwards to 150 mg p.o. q.h.s..  Patient tolerated the individuals well.  To address issues in terms of her mood and mood instability patient was initiated on trials of antidepressants along with mood stabilizers.  During the course of hospitalization patient was placed on trials of Wellbutrin and those doses were advanced to Wellbutrin  mg p.o. q.day. to manage difficulties in terms of her impulse and irritability Maritza's also placed on trials of BuSpar which was titrated upwards to 10 mg p.o. t.i.d..  Patient tolerated this combination of medications well as  well as augmentation with trials of Trileptal 300 mg p.o. b.i.d..  Patient did show some benefit with whose individuals in his shows some slow but routine brightening range of affect.  Patient remained confined to the inpatient setting for duration of 8 days.  During the time patient only begun to show enough stabilization of mood reductions level of anxiety whose felt that Maritza's could safely integrate into a lower level of care.    Patient showed complete resolution of thoughts to self-injurious substance with the discharge to home with referral to outpatient services for mental health prescriber as well as mental health therapist.  Patient discharged on 06/28/2025 and stable condition.  Patient had no medical emergencies during her stay nor did she has a any psychiatric emergencies requiring physical restraints IM medications were locked seclusion.  Patient was discharged home in stable condition.

## 2025-06-28 NOTE — NURSING
Discharge Note:    Stpeh Alejandra is a 29 y.o. female, : 1995, MRN: [id number], admitted on 2025 for Oscar Zurita MD with a diagnosis of Psychosis, unspecified psychosis type [F29]  Psychosis, unspecified psychosis type [F29].    Patient discharged home via personal vehicle on 2025 per physician orders in stable condition. Patient denied suicidal ideation, homicidal ideation, or hallucinations.  Patient mediation compliant with no c/o adverse effects.  Patient was discharged with valuables, personal belongings, prescriptions, discharge instructions, printed Warning Sings of Self-Harm pursuant to Act 737 and, an educational handout explaining the diagnosis and prescribed medications. Patient verbalized understanding of the discharge instructions and importance of follow-up visits. Patient was escorted out of the facility by [facility] and placed into vehicle.     Patient discharged on the following medications:     Medication List        START taking these medications      buPROPion 150 MG TB24 tablet  Commonly known as: WELLBUTRIN XL  Take 1 tablet (150 mg total) by mouth once daily.     busPIRone 10 MG tablet  Commonly known as: BUSPAR  Take 1 tablet (10 mg total) by mouth 3 (three) times daily.     OXcarbazepine 300 MG Tab  Commonly known as: TRILEPTAL  Take 1 tablet (300 mg total) by mouth 2 (two) times daily.            CHANGE how you take these medications      traZODone 150 MG tablet  Commonly known as: DESYREL  Take 1 tablet (150 mg total) by mouth every evening.  What changed:   medication strength  how much to take  when to take this            CONTINUE taking these medications      metoprolol succinate 25 MG 24 hr tablet  Commonly known as: TOPROL-XL            STOP taking these medications      AdderalL 30 mg Tab  Generic drug: dextroamphetamine-amphetamine     CAPLYTA 10.5 mg Cap  Generic drug: lumateperone     furosemide 20 MG tablet  Commonly known as: LASIX      LaMICtal 25 mg tablet  Generic drug: lamoTRIgine     medroxyPROGESTERone 5 MG tablet  Commonly known as: PROVERA     minoxidiL 2 % external solution  Commonly known as: ROGAINE     SLYND 4 mg (28) Tab  Generic drug: drospirenone (contraceptive)               Where to Get Your Medications        These medications were sent to [id number] - [place], [place] - [address] [address]      Phone: [phone]   buPROPion 150 MG TB24 tablet  busPIRone 10 MG tablet  OXcarbazepine 300 MG Tab  traZODone 150 MG tablet

## 2025-06-28 NOTE — PLAN OF CARE
Problem: Anxiety  Goal: Anxiety Reduction or Resolution  Outcome: Met  Intervention: Promote Anxiety Reduction  Flowsheets (Taken 6/27/2025 2056)  Supportive Measures:   active listening utilized   positive reinforcement provided   verbalization of feelings encouraged     Problem: Seizure, Active Management  Goal: Absence of Seizure/Seizure-Related Injury  Outcome: Met  Intervention: Prevent Seizure-Related Injury  Flowsheets (Taken 6/27/2025 2056)  Seizure Precautions:   activity supervised   clutter-free environment maintained
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
